# Patient Record
Sex: FEMALE | Race: WHITE | NOT HISPANIC OR LATINO | Employment: OTHER | URBAN - METROPOLITAN AREA
[De-identification: names, ages, dates, MRNs, and addresses within clinical notes are randomized per-mention and may not be internally consistent; named-entity substitution may affect disease eponyms.]

---

## 2017-12-21 ENCOUNTER — HOSPITAL ENCOUNTER (EMERGENCY)
Facility: OTHER | Age: 72
Discharge: HOME | End: 2017-12-21
Attending: EMERGENCY MEDICINE
Payer: MEDICARE

## 2017-12-21 VITALS
SYSTOLIC BLOOD PRESSURE: 147 MMHG | DIASTOLIC BLOOD PRESSURE: 72 MMHG | OXYGEN SATURATION: 96 % | TEMPERATURE: 98 F | HEART RATE: 90 BPM | RESPIRATION RATE: 18 BRPM

## 2017-12-21 DIAGNOSIS — H61.21 IMPACTED CERUMEN OF RIGHT EAR: Primary | ICD-10-CM

## 2017-12-21 DIAGNOSIS — H66.92 LEFT OTITIS MEDIA, UNSPECIFIED OTITIS MEDIA TYPE: ICD-10-CM

## 2017-12-21 LAB — POCT GLUCOSE: 265 MG/DL (ref 70–110)

## 2017-12-21 PROCEDURE — 99283 EMERGENCY DEPT VISIT LOW MDM: CPT | Mod: 25

## 2017-12-21 PROCEDURE — 82947 ASSAY GLUCOSE BLOOD QUANT: CPT

## 2017-12-21 RX ORDER — AMOXICILLIN AND CLAVULANATE POTASSIUM 875; 125 MG/1; MG/1
1 TABLET, FILM COATED ORAL 2 TIMES DAILY
Qty: 14 TABLET | Refills: 0 | Status: SHIPPED | OUTPATIENT
Start: 2017-12-21 | End: 2019-10-08 | Stop reason: CLARIF

## 2017-12-21 NOTE — ED PROVIDER NOTES
Encounter Date: 12/21/2017       History     Chief Complaint   Patient presents with    Otalgia     patient reports right ear pain X2 days     The history is provided by the patient.   Otalgia   This is a new problem. The current episode started today. There is pain in both ears. The problem occurs constantly. The problem has been unchanged. The pain is at a severity of 3/10. Pertinent negatives include no ear discharge, no headaches, no hearing loss, no rhinorrhea, no sore throat, no abdominal pain, no diarrhea, no vomiting, no neck pain, no cough and no rash. Her past medical history does not include chronic ear infection, hearing loss or tympanostomy tube.     Review of patient's allergies indicates:  No Known Allergies  Past Medical History:   Diagnosis Date    Coronary artery disease     Diabetes mellitus     Hypercholesteremia     Hypertension      Past Surgical History:   Procedure Laterality Date    APPENDECTOMY      BACK SURGERY      CHOLECYSTECTOMY      CORONARY ARTERY BYPASS GRAFT      TUBAL LIGATION       Family History   Problem Relation Age of Onset    Colon cancer Brother 50    Breast cancer Sister 67    Ovarian cancer Neg Hx      Social History   Substance Use Topics    Smoking status: Never Smoker    Smokeless tobacco: Never Used    Alcohol use No     Review of Systems   Constitutional: Negative.    HENT: Positive for ear pain. Negative for ear discharge, hearing loss, rhinorrhea and sore throat.    Eyes: Negative.    Respiratory: Negative.  Negative for cough.    Cardiovascular: Negative.    Gastrointestinal: Negative.  Negative for abdominal pain, diarrhea and vomiting.   Endocrine: Negative.    Genitourinary: Negative.    Musculoskeletal: Negative.  Negative for neck pain.   Skin: Negative.  Negative for rash.   Allergic/Immunologic: Negative.    Neurological: Negative.  Negative for headaches.   Hematological: Negative.    Psychiatric/Behavioral: Negative.    All other systems  reviewed and are negative.      Physical Exam     Initial Vitals [12/21/17 1135]   BP Pulse Resp Temp SpO2   (!) 147/72 90 18 98.1 °F (36.7 °C) 96 %      MAP       97         Physical Exam    Nursing note and vitals reviewed.  Constitutional: Vital signs are normal. She appears well-developed. She is active and cooperative.   HENT:   Head: Normocephalic and atraumatic.   Left Ear: Tympanic membrane mobility is abnormal. A middle ear effusion is present.   Ears:    Eyes: Conjunctivae, EOM and lids are normal. Pupils are equal, round, and reactive to light.   Neck: Trachea normal and full passive range of motion without pain. Neck supple. No thyroid mass present.   Cardiovascular: Normal rate, regular rhythm, S1 normal, S2 normal, normal heart sounds, intact distal pulses and normal pulses.   Abdominal: Soft. Normal appearance, normal aorta and bowel sounds are normal.   Musculoskeletal: Normal range of motion.   Lymphadenopathy:     She has no axillary adenopathy.   Neurological: She is alert and oriented to person, place, and time.   Skin: Skin is warm, dry and intact.   Psychiatric: She has a normal mood and affect. Her speech is normal and behavior is normal. Judgment and thought content normal. Cognition and memory are normal.         ED Course   Procedures  Labs Reviewed   POCT GLUCOSE - Abnormal; Notable for the following:        Result Value    POCT Glucose 265 (*)     All other components within normal limits   POCT GLUCOSE                               ED Course      Clinical Impression:   The primary encounter diagnosis was Impacted cerumen of right ear. A diagnosis of Left otitis media, unspecified otitis media type was also pertinent to this visit.                           Gume Martinez MD  12/21/17 4818

## 2019-09-10 ENCOUNTER — OFFICE VISIT (OUTPATIENT)
Dept: SURGERY | Facility: CLINIC | Age: 74
End: 2019-09-10
Payer: MEDICARE

## 2019-09-10 VITALS
HEART RATE: 70 BPM | BODY MASS INDEX: 30.66 KG/M2 | WEIGHT: 207 LBS | OXYGEN SATURATION: 100 % | HEIGHT: 69 IN | SYSTOLIC BLOOD PRESSURE: 91 MMHG | DIASTOLIC BLOOD PRESSURE: 53 MMHG

## 2019-09-10 DIAGNOSIS — K63.89 COLONIC MASS: Primary | ICD-10-CM

## 2019-09-10 PROCEDURE — 3288F FALL RISK ASSESSMENT DOCD: CPT | Mod: CPTII,S$GLB,, | Performed by: SURGERY

## 2019-09-10 PROCEDURE — 3288F PR FALLS RISK ASSESSMENT DOCUMENTED: ICD-10-PCS | Mod: CPTII,S$GLB,, | Performed by: SURGERY

## 2019-09-10 PROCEDURE — 1100F PTFALLS ASSESS-DOCD GE2>/YR: CPT | Mod: CPTII,S$GLB,, | Performed by: SURGERY

## 2019-09-10 PROCEDURE — 99204 OFFICE O/P NEW MOD 45 MIN: CPT | Mod: S$GLB,,, | Performed by: SURGERY

## 2019-09-10 PROCEDURE — 1100F PR PT FALLS ASSESS DOC 2+ FALLS/FALL W/INJURY/YR: ICD-10-PCS | Mod: CPTII,S$GLB,, | Performed by: SURGERY

## 2019-09-10 PROCEDURE — 99204 PR OFFICE/OUTPT VISIT, NEW, LEVL IV, 45-59 MIN: ICD-10-PCS | Mod: S$GLB,,, | Performed by: SURGERY

## 2019-09-10 RX ORDER — LIDOCAINE HYDROCHLORIDE 10 MG/ML
1 INJECTION, SOLUTION EPIDURAL; INFILTRATION; INTRACAUDAL; PERINEURAL ONCE
Status: SHIPPED | OUTPATIENT
Start: 2019-09-10

## 2019-09-10 RX ORDER — SODIUM CHLORIDE 9 MG/ML
INJECTION, SOLUTION INTRAVENOUS CONTINUOUS
Status: CANCELLED | OUTPATIENT
Start: 2019-09-10

## 2019-09-10 NOTE — PROGRESS NOTES
"History & Physical    SUBJECTIVE:     History of Present Illness:  Patient is a 73 y.o. female presents with with colon polyp on her last colonoscopy needing resection.    Chief Complaint   Patient presents with    Consult       Review of patient's allergies indicates:  No Known Allergies    Current Outpatient Medications   Medication Sig Dispense Refill    amlodipine (NORVASC) 10 MG tablet       amoxicillin-clavulanate 875-125mg (AUGMENTIN) 875-125 mg per tablet Take 1 tablet by mouth 2 (two) times daily. 14 tablet 0    aspirin (ECOTRIN) 325 MG EC tablet Take 325 mg by mouth once daily.      atenolol (TENORMIN) 50 MG tablet       atorvastatin (LIPITOR) 40 MG tablet       glipiZIDE (GLUCOTROL) 10 MG tablet Take 10 mg by mouth 2 (two) times daily before meals.      metformin (GLUCOPHAGE) 1000 MG tablet   0    ranitidine (ZANTAC) 150 MG tablet   2    tramadol (ULTRAM) 50 mg tablet       TRULICITY 1.5 mg/0.5 mL PnIj        No current facility-administered medications for this visit.        Past Medical History:   Diagnosis Date    Coronary artery disease     Diabetes mellitus     Hypercholesteremia     Hypertension      Past Surgical History:   Procedure Laterality Date    APPENDECTOMY      BACK SURGERY      CHOLECYSTECTOMY      CORONARY ARTERY BYPASS GRAFT      TUBAL LIGATION       Family History   Problem Relation Age of Onset    Colon cancer Brother 50    Breast cancer Sister 67    Ovarian cancer Neg Hx      Social History     Tobacco Use    Smoking status: Never Smoker    Smokeless tobacco: Never Used   Substance Use Topics    Alcohol use: No    Drug use: No        Review of Systems:  Review of Systems    OBJECTIVE:     Vital Signs (Most Recent)  Pulse: 70 (09/10/19 1408)  BP: (!) 91/53 (09/10/19 1408)  SpO2: 100 % (09/10/19 1408)  5' 9" (1.753 m)  93.9 kg (207 lb)     Physical Exam:  Physical Exam   Constitutional: She is oriented to person, place, and time. She appears well-developed " and well-nourished.   HENT:   Head: Normocephalic and atraumatic.   Right Ear: External ear normal.   Left Ear: External ear normal.   Nose: Nose normal.   Mouth/Throat: Oropharynx is clear and moist.   Eyes: Pupils are equal, round, and reactive to light. Conjunctivae and EOM are normal.   Neck: Normal range of motion. Neck supple.   Cardiovascular: Normal rate, regular rhythm, normal heart sounds and intact distal pulses.   Pulmonary/Chest: Effort normal and breath sounds normal.   Abdominal: Soft. Bowel sounds are normal.   Musculoskeletal: Normal range of motion.   Neurological: She is alert and oriented to person, place, and time. She has normal reflexes.   Skin: Skin is warm and dry.   Psychiatric: She has a normal mood and affect. Her behavior is normal. Thought content normal.   Vitals reviewed.      Laboratory  none    Diagnostic Results:  none    ASSESSMENT/PLAN:     Right Colon polyp    PLAN:Plan     I will take her to the OR for lap R colectomy with the risks and possible complications were explained

## 2019-09-11 DIAGNOSIS — K63.89 COLONIC MASS: Primary | ICD-10-CM

## 2019-09-11 RX ORDER — POLYETHYLENE GLYCOL 3350, SODIUM SULFATE ANHYDROUS, SODIUM BICARBONATE, SODIUM CHLORIDE, POTASSIUM CHLORIDE 236; 22.74; 6.74; 5.86; 2.97 G/4L; G/4L; G/4L; G/4L; G/4L
4 POWDER, FOR SOLUTION ORAL ONCE
Qty: 4000 ML | Refills: 0 | Status: SHIPPED | OUTPATIENT
Start: 2019-09-11 | End: 2019-09-11

## 2019-09-11 RX ORDER — METRONIDAZOLE 500 MG/1
TABLET ORAL
Qty: 4 TABLET | Refills: 0 | Status: SHIPPED | OUTPATIENT
Start: 2019-09-11 | End: 2019-10-08 | Stop reason: CLARIF

## 2019-09-11 RX ORDER — NEOMYCIN SULFATE 500 MG/1
TABLET ORAL
Qty: 4 TABLET | Refills: 0 | Status: SHIPPED | OUTPATIENT
Start: 2019-09-11 | End: 2019-10-08 | Stop reason: CLARIF

## 2019-10-08 ENCOUNTER — HOSPITAL ENCOUNTER (OUTPATIENT)
Dept: PREADMISSION TESTING | Facility: HOSPITAL | Age: 74
Discharge: HOME | End: 2019-10-08
Attending: SURGERY
Payer: MEDICARE

## 2019-10-08 VITALS
HEIGHT: 69 IN | BODY MASS INDEX: 25.48 KG/M2 | OXYGEN SATURATION: 98 % | SYSTOLIC BLOOD PRESSURE: 105 MMHG | TEMPERATURE: 98 F | WEIGHT: 172 LBS | DIASTOLIC BLOOD PRESSURE: 65 MMHG | RESPIRATION RATE: 18 BRPM | HEART RATE: 68 BPM

## 2019-10-08 DIAGNOSIS — K63.89 COLONIC MASS: ICD-10-CM

## 2019-10-08 LAB
ALBUMIN SERPL BCP-MCNC: 3.5 G/DL (ref 3.5–5.2)
ALP SERPL-CCNC: 251 U/L (ref 55–135)
ALT SERPL W/O P-5'-P-CCNC: 20 U/L (ref 10–44)
ANION GAP SERPL CALC-SCNC: 8 MMOL/L (ref 8–16)
AST SERPL-CCNC: 28 U/L (ref 10–40)
BASOPHILS # BLD AUTO: 0.01 K/UL (ref 0–0.2)
BASOPHILS NFR BLD: 0.2 % (ref 0–1.9)
BILIRUB SERPL-MCNC: 0.7 MG/DL (ref 0.1–1)
BUN SERPL-MCNC: 25 MG/DL (ref 8–23)
CALCIUM SERPL-MCNC: 9.4 MG/DL (ref 8.7–10.5)
CHLORIDE SERPL-SCNC: 104 MMOL/L (ref 95–110)
CO2 SERPL-SCNC: 27 MMOL/L (ref 23–29)
CREAT SERPL-MCNC: 0.8 MG/DL (ref 0.5–1.4)
DIFFERENTIAL METHOD: ABNORMAL
EOSINOPHIL # BLD AUTO: 0.1 K/UL (ref 0–0.5)
EOSINOPHIL NFR BLD: 1.8 % (ref 0–8)
ERYTHROCYTE [DISTWIDTH] IN BLOOD BY AUTOMATED COUNT: 16.2 % (ref 11.5–14.5)
EST. GFR  (AFRICAN AMERICAN): >60 ML/MIN/1.73 M^2
EST. GFR  (NON AFRICAN AMERICAN): >60 ML/MIN/1.73 M^2
GLUCOSE SERPL-MCNC: 153 MG/DL (ref 70–110)
HCT VFR BLD AUTO: 32 % (ref 37–48.5)
HGB BLD-MCNC: 10 G/DL (ref 12–16)
LYMPHOCYTES # BLD AUTO: 0.7 K/UL (ref 1–4.8)
LYMPHOCYTES NFR BLD: 16.2 % (ref 18–48)
MCH RBC QN AUTO: 27.2 PG (ref 27–31)
MCHC RBC AUTO-ENTMCNC: 31.3 G/DL (ref 32–36)
MCV RBC AUTO: 87 FL (ref 82–98)
MONOCYTES # BLD AUTO: 0.3 K/UL (ref 0.3–1)
MONOCYTES NFR BLD: 5.6 % (ref 4–15)
NEUTROPHILS # BLD AUTO: 3.4 K/UL (ref 1.8–7.7)
NEUTROPHILS NFR BLD: 76.4 % (ref 38–73)
PLATELET # BLD AUTO: 98 K/UL (ref 150–350)
PMV BLD AUTO: 10.6 FL (ref 9.2–12.9)
POTASSIUM SERPL-SCNC: 4.1 MMOL/L (ref 3.5–5.1)
PROT SERPL-MCNC: 7.4 G/DL (ref 6–8.4)
RBC # BLD AUTO: 3.67 M/UL (ref 4–5.4)
SODIUM SERPL-SCNC: 139 MMOL/L (ref 136–145)
WBC # BLD AUTO: 4.44 K/UL (ref 3.9–12.7)

## 2019-10-08 PROCEDURE — 85025 COMPLETE CBC W/AUTO DIFF WBC: CPT

## 2019-10-08 PROCEDURE — 80053 COMPREHEN METABOLIC PANEL: CPT

## 2019-10-08 RX ORDER — ACETAMINOPHEN 500 MG
5 TABLET ORAL NIGHTLY
COMMUNITY

## 2019-10-08 RX ORDER — CHOLECALCIFEROL (VITAMIN D3) 1250 MCG
1 TABLET ORAL WEEKLY
COMMUNITY

## 2019-10-08 RX ORDER — ESCITALOPRAM OXALATE 10 MG/1
10 TABLET ORAL DAILY
COMMUNITY

## 2019-10-08 RX ORDER — DOCUSATE SODIUM 100 MG/1
100 CAPSULE, LIQUID FILLED ORAL DAILY
COMMUNITY

## 2019-10-08 RX ORDER — MIRTAZAPINE 7.5 MG/1
7.5 TABLET, FILM COATED ORAL DAILY
COMMUNITY

## 2019-10-08 RX ORDER — INSULIN ASPART 100 [IU]/ML
INJECTION, SOLUTION INTRAVENOUS; SUBCUTANEOUS
COMMUNITY

## 2019-10-08 RX ORDER — PANTOPRAZOLE SODIUM 20 MG/1
20 TABLET, DELAYED RELEASE ORAL DAILY
COMMUNITY

## 2019-10-08 RX ORDER — LOPERAMIDE HCL 2 MG
4 TABLET ORAL DAILY PRN
COMMUNITY

## 2019-10-08 RX ORDER — SIMETHICONE 80 MG
80 TABLET,CHEWABLE ORAL 2 TIMES DAILY
COMMUNITY

## 2019-10-08 RX ORDER — METOPROLOL SUCCINATE 50 MG/1
50 TABLET, EXTENDED RELEASE ORAL DAILY
COMMUNITY

## 2019-10-08 RX ORDER — POTASSIUM CHLORIDE 750 MG/1
10 TABLET, EXTENDED RELEASE ORAL 2 TIMES DAILY
COMMUNITY

## 2019-10-08 RX ORDER — FUROSEMIDE 40 MG/1
40 TABLET ORAL DAILY
COMMUNITY

## 2019-10-08 RX ORDER — GABAPENTIN 100 MG/1
200 CAPSULE ORAL NIGHTLY
COMMUNITY

## 2019-10-08 RX ORDER — ACETAMINOPHEN 500 MG
500 TABLET ORAL EVERY 8 HOURS PRN
COMMUNITY

## 2019-10-08 NOTE — DISCHARGE INSTRUCTIONS
"Your procedure  is scheduled for _10/15/2019_________.    Call [phone] between 2pm and 5pm on _10/14/2019______to find out your arrival time for the day of surgery.    Report to Same Day Surgery Unit at ____ AM on the 2nd floor of the hospital.  Use the front entrance of the hospital.  The front doors of the hospital open promptly at 5:30am.  If you need wheelchair assistance, call [phone] from your cell phone, or call "0" from the courtesy phone in the lobby.    Important instructions:   Do not eat or drink after 12 midnight, including water.  It is okay to brush your teeth.  Do not have gum, candy or mints.     Take only these medications with a small swallow of water on the morning of your surgery _metoprolol, mirtazapine, lexapro_____________    Do not take any diabetic medication on the morning of surgery unless instructed to do so by your doctor or pre op nurse.    Hold apixaban for 72 hours before surgery.    Stop taking Aspirin, Ibuprofen, Motrin and Aleve , Fish oil, and Vitamin E for at least 7 days before your surgery. You may use Tylenol unless otherwise instructed by your doctor.                 Follow bowel prep as ordered by Dr Ellis.     Please shower the night before and the morning of your surgery.        Use Hibiclens soap as instructed by your pre op nurse.   Please place clean linens on your bed the night before surgery. Please wear fresh clean clothing after each shower.     No shaving of procedural area at least 4-5 days before surgery due to increased risk of skin irritation and/or possible infection.      You may be asked to take a third shower on arrival to Same Day Surgery depending on the type of surgery you are having.     Do not wear make- up, including mascara.     You may wear deodorant only.      Do not wear powder, body lotion or perfume/cologne.     Do not wear any jewelry or have any metal on your body.     You will be asked to remove any dentures or partials for " the procedure.     Please bring any documents given to you by your doctor.     If you are going home on the same day of surgery, you must arrange for a family member or a friend to drive you home.  Public transportation is prohibited.  You will not be able to drive home if you were given anesthesia or sedation.     Wear loose fitting clothes allowing for bandages.     Please leave money and valuables home.       You may bring your cell phone.     Call the doctor if fever or illness should occur before your surgery.    Call [phone] to contact us here if needed.

## 2019-10-14 ENCOUNTER — ANESTHESIA EVENT (OUTPATIENT)
Dept: SURGERY | Facility: HOSPITAL | Age: 74
DRG: 329 | End: 2019-10-14
Payer: MEDICARE

## 2019-10-14 NOTE — ANESTHESIA PREPROCEDURE EVALUATION
10/14/2019  Ivan Cruz is a 73 y.o., female.    Anesthesia Evaluation     I have reviewed the Nursing Notes.      Review of Systems  Anesthesia Hx:  No problems with previous Anesthesia   Social:  Non-Smoker    Hematology/Oncology:         -- Anemia: Hematology Comments: Pancytopenia; Hb =10, plt=98  On eliquis for afib   Cardiovascular:   Exercise tolerance: poor Denies Pacemaker. Hypertension Valvular problems/Murmurs, AS CAD   CABG/stent (1999) Dysrhythmias atrial fibrillation CHF hyperlipidemia ECG has been reviewed.    Pulmonary:  Pulmonary Normal    Renal/:  Renal/ Normal     Hepatic/GI:   Denies PUD. Denies Hiatal Hernia. GERD Denies Liver Disease.  Denies Hepatitis.    Musculoskeletal:   Arthritis     Neurological:   Denies Seizures.    Endocrine:   Diabetes, type 2, using insulin        Physical Exam  General:  Well nourished Frail appearing    Airway/Jaw/Neck:  AIRWAY FINDINGS: Normal      Chest/Lungs:  Chest/Lungs Clear    Heart/Vascular:  Heart Findings: Normal       Mental Status:  Mental Status Findings: Normal        Anesthesia Plan  Type of Anesthesia, risks & benefits discussed:  Anesthesia Type:  general  Patient's Preference:   Intra-op Monitoring Plan: standard ASA monitors and arterial line  Intra-op Monitoring Plan Comments:   Post Op Pain Control Plan:   Post Op Pain Control Plan Comments:   Induction:   IV  Beta Blocker:  Patient is on a Beta-Blocker and has received one dose within the past 24 hours (No further documentation required).       Informed Consent: Patient understands risks and agrees with Anesthesia plan.  Questions answered. Anesthesia consent signed with patient.  ASA Score: 3     Day of Surgery Review of History & Physical:  There are no significant changes.  H&P update referred to the provider.         Ready For Surgery From Anesthesia Perspective.

## 2019-10-15 ENCOUNTER — ANESTHESIA (OUTPATIENT)
Dept: SURGERY | Facility: HOSPITAL | Age: 74
DRG: 329 | End: 2019-10-15
Payer: MEDICARE

## 2019-10-15 ENCOUNTER — TELEPHONE (OUTPATIENT)
Dept: SURGERY | Facility: CLINIC | Age: 74
End: 2019-10-15

## 2019-10-15 ENCOUNTER — HOSPITAL ENCOUNTER (INPATIENT)
Facility: HOSPITAL | Age: 74
LOS: 8 days | Discharge: HOME | DRG: 329 | End: 2019-10-23
Attending: SURGERY | Admitting: SURGERY
Payer: MEDICARE

## 2019-10-15 DIAGNOSIS — K63.89 COLONIC MASS: Primary | ICD-10-CM

## 2019-10-15 DIAGNOSIS — I50.30 (HFPEF) HEART FAILURE WITH PRESERVED EJECTION FRACTION: ICD-10-CM

## 2019-10-15 LAB
ABO + RH BLD: NORMAL
BLD GP AB SCN CELLS X3 SERPL QL: NORMAL
BLOOD GROUP ANTIBODIES SERPL: NORMAL
POCT GLUCOSE: 119 MG/DL (ref 70–110)
POCT GLUCOSE: 144 MG/DL (ref 70–110)
POCT GLUCOSE: 148 MG/DL (ref 70–110)
POCT GLUCOSE: 157 MG/DL (ref 70–110)
RH BLD: NORMAL

## 2019-10-15 PROCEDURE — 25000003 PHARM REV CODE 250: Performed by: NURSE ANESTHETIST, CERTIFIED REGISTERED

## 2019-10-15 PROCEDURE — 88307 TISSUE SPECIMEN TO PATHOLOGY - SURGERY: ICD-10-PCS | Mod: 26,,, | Performed by: PATHOLOGY

## 2019-10-15 PROCEDURE — 63600175 PHARM REV CODE 636 W HCPCS: Performed by: NURSE ANESTHETIST, CERTIFIED REGISTERED

## 2019-10-15 PROCEDURE — D9220A PRA ANESTHESIA: Mod: ANES,,, | Performed by: ANESTHESIOLOGY

## 2019-10-15 PROCEDURE — 63600175 PHARM REV CODE 636 W HCPCS: Performed by: ANESTHESIOLOGY

## 2019-10-15 PROCEDURE — V2790 AMNIOTIC MEMBRANE: HCPCS | Performed by: SURGERY

## 2019-10-15 PROCEDURE — 27201423 OPTIME MED/SURG SUP & DEVICES STERILE SUPPLY: Performed by: SURGERY

## 2019-10-15 PROCEDURE — 36000710: Performed by: SURGERY

## 2019-10-15 PROCEDURE — 86850 RBC ANTIBODY SCREEN: CPT

## 2019-10-15 PROCEDURE — 86922 COMPATIBILITY TEST ANTIGLOB: CPT

## 2019-10-15 PROCEDURE — 99900035 HC TECH TIME PER 15 MIN (STAT)

## 2019-10-15 PROCEDURE — 37000009 HC ANESTHESIA EA ADD 15 MINS: Performed by: SURGERY

## 2019-10-15 PROCEDURE — 94760 N-INVAS EAR/PLS OXIMETRY 1: CPT

## 2019-10-15 PROCEDURE — 25000003 PHARM REV CODE 250: Performed by: SURGERY

## 2019-10-15 PROCEDURE — 86870 RBC ANTIBODY IDENTIFICATION: CPT

## 2019-10-15 PROCEDURE — 63600175 PHARM REV CODE 636 W HCPCS: Performed by: SURGERY

## 2019-10-15 PROCEDURE — 37000008 HC ANESTHESIA 1ST 15 MINUTES: Performed by: SURGERY

## 2019-10-15 PROCEDURE — 71000033 HC RECOVERY, INTIAL HOUR: Performed by: SURGERY

## 2019-10-15 PROCEDURE — 44205 PR LAP,SURG,COLECTOMY,W/REMVL TERM ILEUM: ICD-10-PCS | Mod: ,,, | Performed by: SURGERY

## 2019-10-15 PROCEDURE — 86901 BLOOD TYPING SEROLOGIC RH(D): CPT

## 2019-10-15 PROCEDURE — 86905 BLOOD TYPING RBC ANTIGENS: CPT | Mod: 91

## 2019-10-15 PROCEDURE — 44205 LAP COLECTOMY PART W/ILEUM: CPT | Mod: ,,, | Performed by: SURGERY

## 2019-10-15 PROCEDURE — 36415 COLL VENOUS BLD VENIPUNCTURE: CPT

## 2019-10-15 PROCEDURE — 36000711: Performed by: SURGERY

## 2019-10-15 PROCEDURE — 82962 GLUCOSE BLOOD TEST: CPT | Performed by: SURGERY

## 2019-10-15 PROCEDURE — 86902 BLOOD TYPE ANTIGEN DONOR EA: CPT

## 2019-10-15 PROCEDURE — 88307 TISSUE EXAM BY PATHOLOGIST: CPT | Mod: 26,,, | Performed by: PATHOLOGY

## 2019-10-15 PROCEDURE — 94761 N-INVAS EAR/PLS OXIMETRY MLT: CPT

## 2019-10-15 PROCEDURE — D9220A PRA ANESTHESIA: ICD-10-PCS | Mod: CRNA,,, | Performed by: NURSE ANESTHETIST, CERTIFIED REGISTERED

## 2019-10-15 PROCEDURE — 27000221 HC OXYGEN, UP TO 24 HOURS

## 2019-10-15 PROCEDURE — C9290 INJ, BUPIVACAINE LIPOSOME: HCPCS | Performed by: SURGERY

## 2019-10-15 PROCEDURE — 63600175 PHARM REV CODE 636 W HCPCS

## 2019-10-15 PROCEDURE — 88307 TISSUE EXAM BY PATHOLOGIST: CPT | Performed by: PATHOLOGY

## 2019-10-15 PROCEDURE — D9220A PRA ANESTHESIA: ICD-10-PCS | Mod: ANES,,, | Performed by: ANESTHESIOLOGY

## 2019-10-15 PROCEDURE — 11000001 HC ACUTE MED/SURG PRIVATE ROOM

## 2019-10-15 PROCEDURE — 71000039 HC RECOVERY, EACH ADD'L HOUR: Performed by: SURGERY

## 2019-10-15 PROCEDURE — D9220A PRA ANESTHESIA: Mod: CRNA,,, | Performed by: NURSE ANESTHETIST, CERTIFIED REGISTERED

## 2019-10-15 DEVICE — MEMBRANE AMNIOFIX 2X12CM: Type: IMPLANTABLE DEVICE | Site: ABDOMEN | Status: FUNCTIONAL

## 2019-10-15 RX ORDER — MUPIROCIN 20 MG/G
1 OINTMENT TOPICAL 2 TIMES DAILY
Status: DISPENSED | OUTPATIENT
Start: 2019-10-15 | End: 2019-10-20

## 2019-10-15 RX ORDER — SODIUM CHLORIDE, SODIUM LACTATE, POTASSIUM CHLORIDE, CALCIUM CHLORIDE 600; 310; 30; 20 MG/100ML; MG/100ML; MG/100ML; MG/100ML
INJECTION, SOLUTION INTRAVENOUS CONTINUOUS PRN
Status: DISCONTINUED | OUTPATIENT
Start: 2019-10-15 | End: 2019-10-15

## 2019-10-15 RX ORDER — INSULIN ASPART 100 [IU]/ML
0-5 INJECTION, SOLUTION INTRAVENOUS; SUBCUTANEOUS
Status: DISCONTINUED | OUTPATIENT
Start: 2019-10-15 | End: 2019-10-23 | Stop reason: HOSPADM

## 2019-10-15 RX ORDER — GLYCOPYRROLATE 0.2 MG/ML
INJECTION INTRAMUSCULAR; INTRAVENOUS
Status: DISCONTINUED | OUTPATIENT
Start: 2019-10-15 | End: 2019-10-15

## 2019-10-15 RX ORDER — ESCITALOPRAM OXALATE 10 MG/1
10 TABLET ORAL DAILY
Status: DISCONTINUED | OUTPATIENT
Start: 2019-10-15 | End: 2019-10-16

## 2019-10-15 RX ORDER — ACETAMINOPHEN 10 MG/ML
1000 INJECTION, SOLUTION INTRAVENOUS ONCE
Status: DISCONTINUED | OUTPATIENT
Start: 2019-10-15 | End: 2019-10-15

## 2019-10-15 RX ORDER — SODIUM CHLORIDE 0.9 % (FLUSH) 0.9 %
3 SYRINGE (ML) INJECTION
Status: DISCONTINUED | OUTPATIENT
Start: 2019-10-15 | End: 2019-10-15 | Stop reason: HOSPADM

## 2019-10-15 RX ORDER — SODIUM CHLORIDE 9 MG/ML
INJECTION, SOLUTION INTRAVENOUS CONTINUOUS
Status: DISCONTINUED | OUTPATIENT
Start: 2019-10-15 | End: 2019-10-16

## 2019-10-15 RX ORDER — NEOSTIGMINE METHYLSULFATE 1 MG/ML
INJECTION, SOLUTION INTRAVENOUS
Status: DISCONTINUED | OUTPATIENT
Start: 2019-10-15 | End: 2019-10-15

## 2019-10-15 RX ORDER — BUPIVACAINE HYDROCHLORIDE 2.5 MG/ML
INJECTION, SOLUTION EPIDURAL; INFILTRATION; INTRACAUDAL
Status: DISCONTINUED | OUTPATIENT
Start: 2019-10-15 | End: 2019-10-15 | Stop reason: HOSPADM

## 2019-10-15 RX ORDER — ACETAMINOPHEN 10 MG/ML
INJECTION, SOLUTION INTRAVENOUS
Status: DISPENSED
Start: 2019-10-15 | End: 2019-10-15

## 2019-10-15 RX ORDER — GLUCAGON 1 MG
1 KIT INJECTION
Status: DISCONTINUED | OUTPATIENT
Start: 2019-10-15 | End: 2019-10-23 | Stop reason: HOSPADM

## 2019-10-15 RX ORDER — MORPHINE SULFATE 10 MG/ML
3 INJECTION INTRAMUSCULAR; INTRAVENOUS; SUBCUTANEOUS EVERY 4 HOURS PRN
Status: DISCONTINUED | OUTPATIENT
Start: 2019-10-15 | End: 2019-10-16

## 2019-10-15 RX ORDER — MORPHINE SULFATE 10 MG/ML
5 INJECTION INTRAMUSCULAR; INTRAVENOUS; SUBCUTANEOUS EVERY 4 HOURS PRN
Status: DISCONTINUED | OUTPATIENT
Start: 2019-10-15 | End: 2019-10-15

## 2019-10-15 RX ORDER — SODIUM CHLORIDE 9 MG/ML
INJECTION, SOLUTION INTRAVENOUS CONTINUOUS
Status: DISCONTINUED | OUTPATIENT
Start: 2019-10-15 | End: 2019-10-15

## 2019-10-15 RX ORDER — ETOMIDATE 2 MG/ML
INJECTION INTRAVENOUS
Status: DISCONTINUED | OUTPATIENT
Start: 2019-10-15 | End: 2019-10-15

## 2019-10-15 RX ORDER — SODIUM CHLORIDE, SODIUM LACTATE, POTASSIUM CHLORIDE, CALCIUM CHLORIDE 600; 310; 30; 20 MG/100ML; MG/100ML; MG/100ML; MG/100ML
INJECTION, SOLUTION INTRAVENOUS CONTINUOUS
Status: DISCONTINUED | OUTPATIENT
Start: 2019-10-15 | End: 2019-10-15

## 2019-10-15 RX ORDER — IBUPROFEN 200 MG
24 TABLET ORAL
Status: DISCONTINUED | OUTPATIENT
Start: 2019-10-15 | End: 2019-10-23 | Stop reason: HOSPADM

## 2019-10-15 RX ORDER — MIRTAZAPINE 7.5 MG/1
7.5 TABLET, FILM COATED ORAL DAILY
Status: DISCONTINUED | OUTPATIENT
Start: 2019-10-15 | End: 2019-10-16

## 2019-10-15 RX ORDER — HYDROMORPHONE HYDROCHLORIDE 2 MG/ML
INJECTION, SOLUTION INTRAMUSCULAR; INTRAVENOUS; SUBCUTANEOUS
Status: COMPLETED
Start: 2019-10-15 | End: 2019-10-15

## 2019-10-15 RX ORDER — LIDOCAINE HYDROCHLORIDE 10 MG/ML
1 INJECTION, SOLUTION EPIDURAL; INFILTRATION; INTRACAUDAL; PERINEURAL ONCE
Status: DISCONTINUED | OUTPATIENT
Start: 2019-10-15 | End: 2019-10-15

## 2019-10-15 RX ORDER — FENTANYL CITRATE 50 UG/ML
25 INJECTION, SOLUTION INTRAMUSCULAR; INTRAVENOUS EVERY 5 MIN PRN
Status: DISCONTINUED | OUTPATIENT
Start: 2019-10-15 | End: 2019-10-15 | Stop reason: HOSPADM

## 2019-10-15 RX ORDER — PHENYLEPHRINE HYDROCHLORIDE 10 MG/ML
INJECTION INTRAVENOUS
Status: DISCONTINUED | OUTPATIENT
Start: 2019-10-15 | End: 2019-10-15

## 2019-10-15 RX ORDER — ALVIMOPAN 12 MG/1
12 CAPSULE ORAL 2 TIMES DAILY
Status: DISCONTINUED | OUTPATIENT
Start: 2019-10-15 | End: 2019-10-16

## 2019-10-15 RX ORDER — LIDOCAINE HCL/PF 100 MG/5ML
SYRINGE (ML) INTRAVENOUS
Status: DISCONTINUED | OUTPATIENT
Start: 2019-10-15 | End: 2019-10-15

## 2019-10-15 RX ORDER — IBUPROFEN 200 MG
16 TABLET ORAL
Status: DISCONTINUED | OUTPATIENT
Start: 2019-10-15 | End: 2019-10-23 | Stop reason: HOSPADM

## 2019-10-15 RX ORDER — ONDANSETRON 2 MG/ML
INJECTION INTRAMUSCULAR; INTRAVENOUS
Status: DISCONTINUED | OUTPATIENT
Start: 2019-10-15 | End: 2019-10-15

## 2019-10-15 RX ORDER — HYDROMORPHONE HYDROCHLORIDE 2 MG/ML
0.2 INJECTION, SOLUTION INTRAMUSCULAR; INTRAVENOUS; SUBCUTANEOUS EVERY 5 MIN PRN
Status: DISCONTINUED | OUTPATIENT
Start: 2019-10-15 | End: 2019-10-15 | Stop reason: HOSPADM

## 2019-10-15 RX ORDER — CEFAZOLIN SODIUM 2 G/50ML
2 SOLUTION INTRAVENOUS
Status: COMPLETED | OUTPATIENT
Start: 2019-10-15 | End: 2019-10-15

## 2019-10-15 RX ORDER — FENTANYL CITRATE 50 UG/ML
INJECTION, SOLUTION INTRAMUSCULAR; INTRAVENOUS
Status: DISCONTINUED | OUTPATIENT
Start: 2019-10-15 | End: 2019-10-15

## 2019-10-15 RX ORDER — ROCURONIUM BROMIDE 10 MG/ML
INJECTION, SOLUTION INTRAVENOUS
Status: DISCONTINUED | OUTPATIENT
Start: 2019-10-15 | End: 2019-10-15

## 2019-10-15 RX ORDER — LIDOCAINE HYDROCHLORIDE 10 MG/ML
1 INJECTION, SOLUTION EPIDURAL; INFILTRATION; INTRACAUDAL; PERINEURAL ONCE
Status: DISCONTINUED | OUTPATIENT
Start: 2019-10-15 | End: 2019-10-23 | Stop reason: HOSPADM

## 2019-10-15 RX ORDER — METOPROLOL SUCCINATE 50 MG/1
50 TABLET, EXTENDED RELEASE ORAL DAILY
Status: DISCONTINUED | OUTPATIENT
Start: 2019-10-15 | End: 2019-10-23 | Stop reason: HOSPADM

## 2019-10-15 RX ORDER — ACETAMINOPHEN 10 MG/ML
1000 INJECTION, SOLUTION INTRAVENOUS ONCE
Status: COMPLETED | OUTPATIENT
Start: 2019-10-15 | End: 2019-10-15

## 2019-10-15 RX ADMIN — METOPROLOL SUCCINATE 50 MG: 50 TABLET, EXTENDED RELEASE ORAL at 04:10

## 2019-10-15 RX ADMIN — ROCURONIUM BROMIDE 15 MG: 10 INJECTION, SOLUTION INTRAVENOUS at 09:10

## 2019-10-15 RX ADMIN — PHENYLEPHRINE HYDROCHLORIDE 200 MCG: 10 INJECTION INTRAVENOUS at 10:10

## 2019-10-15 RX ADMIN — ETOMIDATE 12 MG: 2 INJECTION, SOLUTION INTRAVENOUS at 08:10

## 2019-10-15 RX ADMIN — CEFAZOLIN SODIUM 2 G: 2 SOLUTION INTRAVENOUS at 08:10

## 2019-10-15 RX ADMIN — MORPHINE SULFATE 3 MG: 10 INJECTION INTRAVENOUS at 08:10

## 2019-10-15 RX ADMIN — LIDOCAINE HYDROCHLORIDE 50 MG: 20 INJECTION, SOLUTION INTRAVENOUS at 08:10

## 2019-10-15 RX ADMIN — PHENYLEPHRINE HYDROCHLORIDE 100 MCG: 10 INJECTION INTRAVENOUS at 08:10

## 2019-10-15 RX ADMIN — ROCURONIUM BROMIDE 20 MG: 10 INJECTION, SOLUTION INTRAVENOUS at 09:10

## 2019-10-15 RX ADMIN — FENTANYL CITRATE 25 MCG: 50 INJECTION INTRAMUSCULAR; INTRAVENOUS at 08:10

## 2019-10-15 RX ADMIN — SODIUM CHLORIDE: 0.9 INJECTION, SOLUTION INTRAVENOUS at 11:10

## 2019-10-15 RX ADMIN — ROCURONIUM BROMIDE 30 MG: 10 INJECTION, SOLUTION INTRAVENOUS at 08:10

## 2019-10-15 RX ADMIN — HYDROMORPHONE HYDROCHLORIDE 0.2 MG: 2 INJECTION, SOLUTION INTRAMUSCULAR; INTRAVENOUS; SUBCUTANEOUS at 12:10

## 2019-10-15 RX ADMIN — PHENYLEPHRINE HYDROCHLORIDE 100 MCG: 10 INJECTION INTRAVENOUS at 10:10

## 2019-10-15 RX ADMIN — MUPIROCIN 1 G: 20 OINTMENT TOPICAL at 08:10

## 2019-10-15 RX ADMIN — NEOSTIGMINE METHYLSULFATE 4 MG: 1 INJECTION INTRAVENOUS at 10:10

## 2019-10-15 RX ADMIN — PHENYLEPHRINE HYDROCHLORIDE 100 MCG: 10 INJECTION INTRAVENOUS at 09:10

## 2019-10-15 RX ADMIN — SODIUM CHLORIDE: 0.9 INJECTION, SOLUTION INTRAVENOUS at 03:10

## 2019-10-15 RX ADMIN — GLYCOPYRROLATE 0.4 MG: 0.2 INJECTION, SOLUTION INTRAMUSCULAR; INTRAVENOUS at 10:10

## 2019-10-15 RX ADMIN — ESCITALOPRAM OXALATE 10 MG: 10 TABLET ORAL at 04:10

## 2019-10-15 RX ADMIN — PHENYLEPHRINE HYDROCHLORIDE 200 MCG: 10 INJECTION INTRAVENOUS at 08:10

## 2019-10-15 RX ADMIN — ROCURONIUM BROMIDE 15 MG: 10 INJECTION, SOLUTION INTRAVENOUS at 10:10

## 2019-10-15 RX ADMIN — FENTANYL CITRATE 25 MCG: 50 INJECTION INTRAMUSCULAR; INTRAVENOUS at 10:10

## 2019-10-15 RX ADMIN — MIRTAZAPINE 7.5 MG: 7.5 TABLET ORAL at 04:10

## 2019-10-15 RX ADMIN — SODIUM CHLORIDE, SODIUM LACTATE, POTASSIUM CHLORIDE, AND CALCIUM CHLORIDE: .6; .31; .03; .02 INJECTION, SOLUTION INTRAVENOUS at 07:10

## 2019-10-15 RX ADMIN — ACETAMINOPHEN 1000 MG: 10 INJECTION, SOLUTION INTRAVENOUS at 11:10

## 2019-10-15 RX ADMIN — SODIUM CHLORIDE, SODIUM LACTATE, POTASSIUM CHLORIDE, AND CALCIUM CHLORIDE: .6; .31; .03; .02 INJECTION, SOLUTION INTRAVENOUS at 09:10

## 2019-10-15 RX ADMIN — ALVIMOPAN 12 MG: 12 CAPSULE ORAL at 08:10

## 2019-10-15 RX ADMIN — ONDANSETRON 4 MG: 2 INJECTION, SOLUTION INTRAMUSCULAR; INTRAVENOUS at 10:10

## 2019-10-15 NOTE — NURSING
contacted about pt's being very sleepy. He ordered morphine to be decreased to 3mg every 4hrs, continuous pulse ox, and ssi. Pt is currently more awake and alert c/o pain. RT will bring cont pulse ox machine to room.

## 2019-10-15 NOTE — NURSING
Pt arrived to unit, sleepy but easily arousable, oriented x3, unable to tell me what kind of surgery she had today. Denies pain. Incision to R adomen c/d/i. Island dressings c/d/i. Redness noted to sacrum, blanchable, foam dressing applied as well as wedge to maintained positioned to the side.

## 2019-10-15 NOTE — TRANSFER OF CARE
"Anesthesia Transfer of Care Note    Patient: Ivan Cruz    Procedure(s) Performed: Procedure(s) (LRB):  COLECTOMY, RIGHT, LAPAROSCOPIC (N/A)    Patient location: PACU    Anesthesia Type: general    Transport from OR: Transported from OR on room air with adequate spontaneous ventilation    Post pain: adequate analgesia    Post assessment: no apparent anesthetic complications and tolerated procedure well    Post vital signs: stable    Level of consciousness: responds to stimulation and sedated    Nausea/Vomiting: no nausea/vomiting    Complications: none    Transfer of care protocol was followed      Last vitals:   Visit Vitals  /75 (BP Location: Right arm, Patient Position: Lying)   Pulse 80   Temp 37 °C (98.6 °F) (Oral)   Resp 10   Ht 5' 9" (1.753 m)   Wt 78 kg (171 lb 15.3 oz)   SpO2 100%   Breastfeeding? No   BMI 25.39 kg/m²     "

## 2019-10-15 NOTE — H&P
"History & Physical     SUBJECTIVE:      History of Present Illness:  Patient is a 73 y.o. female presents with with colon polyp on her last colonoscopy needing resection.         Chief Complaint   Patient presents with    Consult         Review of patient's allergies indicates:  No Known Allergies            Current Outpatient Medications   Medication Sig Dispense Refill    amlodipine (NORVASC) 10 MG tablet          amoxicillin-clavulanate 875-125mg (AUGMENTIN) 875-125 mg per tablet Take 1 tablet by mouth 2 (two) times daily. 14 tablet 0    aspirin (ECOTRIN) 325 MG EC tablet Take 325 mg by mouth once daily.        atenolol (TENORMIN) 50 MG tablet          atorvastatin (LIPITOR) 40 MG tablet          glipiZIDE (GLUCOTROL) 10 MG tablet Take 10 mg by mouth 2 (two) times daily before meals.        metformin (GLUCOPHAGE) 1000 MG tablet     0    ranitidine (ZANTAC) 150 MG tablet     2    tramadol (ULTRAM) 50 mg tablet          TRULICITY 1.5 mg/0.5 mL PnIj            No current facility-administered medications for this visit.               Past Medical History:   Diagnosis Date    Coronary artery disease      Diabetes mellitus      Hypercholesteremia      Hypertension              Past Surgical History:   Procedure Laterality Date    APPENDECTOMY        BACK SURGERY        CHOLECYSTECTOMY        CORONARY ARTERY BYPASS GRAFT        TUBAL LIGATION                Family History   Problem Relation Age of Onset    Colon cancer Brother 50    Breast cancer Sister 67    Ovarian cancer Neg Hx        Social History           Tobacco Use    Smoking status: Never Smoker    Smokeless tobacco: Never Used   Substance Use Topics    Alcohol use: No    Drug use: No         Review of Systems:  Review of Systems     OBJECTIVE:      Vital Signs (Most Recent)  Pulse: 70 (09/10/19 1408)  BP: (!) 91/53 (09/10/19 1408)  SpO2: 100 % (09/10/19 1408)  5' 9" (1.753 m)  93.9 kg (207 lb)      Physical Exam:  Physical Exam "   Constitutional: She is oriented to person, place, and time. She appears well-developed and well-nourished.   HENT:   Head: Normocephalic and atraumatic.   Right Ear: External ear normal.   Left Ear: External ear normal.   Nose: Nose normal.   Mouth/Throat: Oropharynx is clear and moist.   Eyes: Pupils are equal, round, and reactive to light. Conjunctivae and EOM are normal.   Neck: Normal range of motion. Neck supple.   Cardiovascular: Normal rate, regular rhythm, normal heart sounds and intact distal pulses.   Pulmonary/Chest: Effort normal and breath sounds normal.   Abdominal: Soft. Bowel sounds are normal.   Musculoskeletal: Normal range of motion.   Neurological: She is alert and oriented to person, place, and time. She has normal reflexes.   Skin: Skin is warm and dry.   Psychiatric: She has a normal mood and affect. Her behavior is normal. Thought content normal.   Vitals reviewed.        Laboratory  none     Diagnostic Results:  none     ASSESSMENT/PLAN:      Right Colon polyp     PLAN:Plan      I will take her to the OR for lap R colectomy with the risks and possible complications were explained

## 2019-10-15 NOTE — PLAN OF CARE
Pt took antibiotics as prescribed last night  and this am.  Pt assistant reports pt completed bowl prep without problem and reports stool is clear

## 2019-10-15 NOTE — TELEPHONE ENCOUNTER
Spoke with Ms. Johnson she said pt took her prep and antibiotics as directed.              ----- Message from Mendy Verde sent at 10/14/2019  4:30 PM CDT -----  Contact: Daya  Type: Patient Call Back    Who called:Nikhil    What is the request in detail:patient is scheduled for surgery tomorrow. Medication was delivered but orders are not present. Nikhil is calling to clarify medication. Please call to advise    Can the clinic reply by [facility]?    Would the patient rather a call back or a response via [facility]? Call    Best call back number:[phone]

## 2019-10-15 NOTE — OP NOTE
[facility] [place]  General Surgery  Operative Note    SUMMARY     Date of Procedure: 10/15/2019     Procedure: Procedure(s) (LRB):  COLECTOMY, RIGHT, LAPAROSCOPIC (N/A)       Surgeon(s) and Role:     * Claeb Ellis MD - Primary    Assisting Surgeon: Klaus Guzman MD    Pre-Operative Diagnosis: Colonic mass [K63.9]    Post-Operative Diagnosis: Post-Op Diagnosis Codes:     * Colonic mass [K63.89]    Anesthesia: General    Technical Procedures Used:  Patient was taken to the operating room placed on operating table in supine position general anesthesia was induced she was placed in lithotomy prepped and draped around her abdomen in the usual sterile fashion.  An incision was made just above and to the left of her umbilicus through which a 5 mm port was inserted under direct vision.  The abdomen was insufflated with 3 have L of CO2 and another port was placed in the left lower quadrant. She has 6 extensive adhesions to anterior abdominal wall and evidence of some portosystemic shunts.  These were taken down sharply as well as bluntly using Harmonic scalpel. The tattoo in the cecum as well as the ascending colon could be identified the entire right colon was then mobilized in a medial fashion off of the patient's abdominal wall. Should be stated that her kidney was then closed juxtaposition to her colon with was able to come off of her with the mesentery being plastered posteriorly.  The mesentery between her distal ileum and heard transverse colon was then divided. Specimen was brought out of the hand port site on the right lower quadrant and the distal ileum as well as transverse colon were divided and a formal side-to-side functional end-to-end anastomosis was then performed. Specimen was opened on the back table and a flat polyp could be seen between the 2 tattoo sites. Same and membrane was placed along the anastomosis anteriorly and posteriorly was dropped back into the patient's abdominal cavity.  Evicel was used for the cut edges because of a low platelet count and then the ports removed and the port sites all closed in layers with absorbable suture. The hand port site as well as the other port sites was injected with Exparel Marcaine. Dermabond tape was placed she was awakened and transported to the recovery room in satisfactory condition.    Description of the Findings of the Procedure:  Cecal polyp with extensive adhesions    Significant Surgical Tasks Conducted by the Assistant(s), if Applicable:  Less than 50%    Complications: No    Estimated Blood Loss (EBL): 100 mL           Implants:   Implant Name Type Inv. Item Serial No.  Lot No. LRB No. Used   [id number]  MEMBRANE AMNIOFIX [id number] [id number]  N/A 1       Specimens:   Specimen (12h ago, onward)     Start     Ordered    10/15/19 1021  Specimen to Pathology - Surgery  Once     Comments:  Pre-op Diagnosis: Colonic mass [K63.9]Procedure(s):COLECTOMY, RIGHT, LAPAROSCOPIC Number of specimens: 1Name of specimens: Cecum      10/15/19 1054                        Condition: Good    Disposition: PACU - hemodynamically stable.    Attestation: I was present and scrubbed for the entire procedure.

## 2019-10-15 NOTE — CARE UPDATE
Pt stated her pain was 6/10 given 0.2mg Dilaudid per order. Pt fell asleep and was difficult to rouse and began obstructing causing O2 sat to drop to low 75%, jaw thrust allowed pt to breathe without obstruction. After continuous rounds of obstruction/jawthrust this author was able to consult with anesthesia and placed NPA and non-rebreather mask at 15L O2, in order to maintain proper levels, as opposed to giving narcan. Pt assessed by ANES several times throughout extended phase 1 recovery

## 2019-10-16 LAB
ALBUMIN SERPL BCP-MCNC: 3.3 G/DL (ref 3.5–5.2)
ALLENS TEST: ABNORMAL
ALP SERPL-CCNC: 222 U/L (ref 55–135)
ALT SERPL W/O P-5'-P-CCNC: 21 U/L (ref 10–44)
AMMONIA PLAS-SCNC: 46 UMOL/L (ref 10–50)
ANION GAP SERPL CALC-SCNC: 8 MMOL/L (ref 8–16)
ANION GAP SERPL CALC-SCNC: 8 MMOL/L (ref 8–16)
AST SERPL-CCNC: 29 U/L (ref 10–40)
BASOPHILS # BLD AUTO: 0.01 K/UL (ref 0–0.2)
BASOPHILS # BLD AUTO: 0.02 K/UL (ref 0–0.2)
BASOPHILS NFR BLD: 0.1 % (ref 0–1.9)
BASOPHILS NFR BLD: 0.2 % (ref 0–1.9)
BILIRUB SERPL-MCNC: 1 MG/DL (ref 0.1–1)
BUN SERPL-MCNC: 20 MG/DL (ref 8–23)
BUN SERPL-MCNC: 20 MG/DL (ref 8–23)
CALCIUM SERPL-MCNC: 8.8 MG/DL (ref 8.7–10.5)
CALCIUM SERPL-MCNC: 8.8 MG/DL (ref 8.7–10.5)
CHLORIDE SERPL-SCNC: 107 MMOL/L (ref 95–110)
CHLORIDE SERPL-SCNC: 107 MMOL/L (ref 95–110)
CO2 SERPL-SCNC: 23 MMOL/L (ref 23–29)
CO2 SERPL-SCNC: 23 MMOL/L (ref 23–29)
CREAT SERPL-MCNC: 0.8 MG/DL (ref 0.5–1.4)
CREAT SERPL-MCNC: 0.8 MG/DL (ref 0.5–1.4)
DELSYS: ABNORMAL
DIFFERENTIAL METHOD: ABNORMAL
DIFFERENTIAL METHOD: ABNORMAL
EOSINOPHIL # BLD AUTO: 0 K/UL (ref 0–0.5)
EOSINOPHIL # BLD AUTO: 0.1 K/UL (ref 0–0.5)
EOSINOPHIL NFR BLD: 0.2 % (ref 0–8)
EOSINOPHIL NFR BLD: 0.5 % (ref 0–8)
ERYTHROCYTE [DISTWIDTH] IN BLOOD BY AUTOMATED COUNT: 16.7 % (ref 11.5–14.5)
ERYTHROCYTE [DISTWIDTH] IN BLOOD BY AUTOMATED COUNT: 16.7 % (ref 11.5–14.5)
EST. GFR  (AFRICAN AMERICAN): >60 ML/MIN/1.73 M^2
EST. GFR  (AFRICAN AMERICAN): >60 ML/MIN/1.73 M^2
EST. GFR  (NON AFRICAN AMERICAN): >60 ML/MIN/1.73 M^2
EST. GFR  (NON AFRICAN AMERICAN): >60 ML/MIN/1.73 M^2
FIO2: 28
FLOW: 2
GLUCOSE SERPL-MCNC: 155 MG/DL (ref 70–110)
GLUCOSE SERPL-MCNC: 155 MG/DL (ref 70–110)
HCO3 UR-SCNC: 24.9 MMOL/L (ref 24–28)
HCT VFR BLD AUTO: 30 % (ref 37–48.5)
HCT VFR BLD AUTO: 31.3 % (ref 37–48.5)
HGB BLD-MCNC: 8.7 G/DL (ref 12–16)
HGB BLD-MCNC: 9.3 G/DL (ref 12–16)
IMM GRANULOCYTES # BLD AUTO: 0.02 K/UL (ref 0–0.04)
IMM GRANULOCYTES # BLD AUTO: 0.05 K/UL (ref 0–0.04)
IMM GRANULOCYTES NFR BLD AUTO: 0.2 % (ref 0–0.5)
IMM GRANULOCYTES NFR BLD AUTO: 0.5 % (ref 0–0.5)
LYMPHOCYTES # BLD AUTO: 0.7 K/UL (ref 1–4.8)
LYMPHOCYTES # BLD AUTO: 1.1 K/UL (ref 1–4.8)
LYMPHOCYTES NFR BLD: 12 % (ref 18–48)
LYMPHOCYTES NFR BLD: 6.1 % (ref 18–48)
MAGNESIUM SERPL-MCNC: 1.8 MG/DL (ref 1.6–2.6)
MCH RBC QN AUTO: 26.4 PG (ref 27–31)
MCH RBC QN AUTO: 26.6 PG (ref 27–31)
MCHC RBC AUTO-ENTMCNC: 29 G/DL (ref 32–36)
MCHC RBC AUTO-ENTMCNC: 29.7 G/DL (ref 32–36)
MCV RBC AUTO: 89 FL (ref 82–98)
MCV RBC AUTO: 91 FL (ref 82–98)
MODE: ABNORMAL
MONOCYTES # BLD AUTO: 0.9 K/UL (ref 0.3–1)
MONOCYTES # BLD AUTO: 0.9 K/UL (ref 0.3–1)
MONOCYTES NFR BLD: 8.5 % (ref 4–15)
MONOCYTES NFR BLD: 9.8 % (ref 4–15)
NEUTROPHILS # BLD AUTO: 7.1 K/UL (ref 1.8–7.7)
NEUTROPHILS # BLD AUTO: 9.2 K/UL (ref 1.8–7.7)
NEUTROPHILS NFR BLD: 77.3 % (ref 38–73)
NEUTROPHILS NFR BLD: 84.6 % (ref 38–73)
NRBC BLD-RTO: 0 /100 WBC
NRBC BLD-RTO: 0 /100 WBC
PCO2 BLDA: 53.5 MMHG (ref 35–45)
PH SMN: 7.28 [PH] (ref 7.35–7.45)
PLATELET # BLD AUTO: 124 K/UL (ref 150–350)
PLATELET # BLD AUTO: 91 K/UL (ref 150–350)
PMV BLD AUTO: 10.5 FL (ref 9.2–12.9)
PMV BLD AUTO: 10.6 FL (ref 9.2–12.9)
PO2 BLDA: 131 MMHG (ref 80–100)
POC BE: -2 MMOL/L
POC SATURATED O2: 98 % (ref 95–100)
POC TCO2: 26 MMOL/L (ref 23–27)
POCT GLUCOSE: 133 MG/DL (ref 70–110)
POCT GLUCOSE: 160 MG/DL (ref 70–110)
POCT GLUCOSE: 162 MG/DL (ref 70–110)
POCT GLUCOSE: 162 MG/DL (ref 70–110)
POCT GLUCOSE: 164 MG/DL (ref 70–110)
POCT GLUCOSE: 186 MG/DL (ref 70–110)
POTASSIUM SERPL-SCNC: 4.7 MMOL/L (ref 3.5–5.1)
POTASSIUM SERPL-SCNC: 4.7 MMOL/L (ref 3.5–5.1)
PROT SERPL-MCNC: 6.7 G/DL (ref 6–8.4)
RBC # BLD AUTO: 3.29 M/UL (ref 4–5.4)
RBC # BLD AUTO: 3.5 M/UL (ref 4–5.4)
SAMPLE: ABNORMAL
SITE: ABNORMAL
SODIUM SERPL-SCNC: 138 MMOL/L (ref 136–145)
SODIUM SERPL-SCNC: 138 MMOL/L (ref 136–145)
SP02: 97
WBC # BLD AUTO: 10.87 K/UL (ref 3.9–12.7)
WBC # BLD AUTO: 9.14 K/UL (ref 3.9–12.7)

## 2019-10-16 PROCEDURE — 97162 PT EVAL MOD COMPLEX 30 MIN: CPT

## 2019-10-16 PROCEDURE — 94761 N-INVAS EAR/PLS OXIMETRY MLT: CPT

## 2019-10-16 PROCEDURE — 63600175 PHARM REV CODE 636 W HCPCS: Performed by: STUDENT IN AN ORGANIZED HEALTH CARE EDUCATION/TRAINING PROGRAM

## 2019-10-16 PROCEDURE — 99900035 HC TECH TIME PER 15 MIN (STAT)

## 2019-10-16 PROCEDURE — 82803 BLOOD GASES ANY COMBINATION: CPT

## 2019-10-16 PROCEDURE — 80048 BASIC METABOLIC PNL TOTAL CA: CPT

## 2019-10-16 PROCEDURE — 83605 ASSAY OF LACTIC ACID: CPT

## 2019-10-16 PROCEDURE — 93010 EKG 12-LEAD: ICD-10-PCS | Mod: ,,, | Performed by: INTERNAL MEDICINE

## 2019-10-16 PROCEDURE — 20000000 HC ICU ROOM

## 2019-10-16 PROCEDURE — 94799 UNLISTED PULMONARY SVC/PX: CPT

## 2019-10-16 PROCEDURE — 25000003 PHARM REV CODE 250

## 2019-10-16 PROCEDURE — 93005 ELECTROCARDIOGRAM TRACING: CPT

## 2019-10-16 PROCEDURE — 97110 THERAPEUTIC EXERCISES: CPT

## 2019-10-16 PROCEDURE — 36600 WITHDRAWAL OF ARTERIAL BLOOD: CPT

## 2019-10-16 PROCEDURE — 25000242 PHARM REV CODE 250 ALT 637 W/ HCPCS: Performed by: STUDENT IN AN ORGANIZED HEALTH CARE EDUCATION/TRAINING PROGRAM

## 2019-10-16 PROCEDURE — 82140 ASSAY OF AMMONIA: CPT

## 2019-10-16 PROCEDURE — 83735 ASSAY OF MAGNESIUM: CPT

## 2019-10-16 PROCEDURE — 85025 COMPLETE CBC W/AUTO DIFF WBC: CPT

## 2019-10-16 PROCEDURE — 27000221 HC OXYGEN, UP TO 24 HOURS

## 2019-10-16 PROCEDURE — 25000003 PHARM REV CODE 250: Performed by: SURGERY

## 2019-10-16 PROCEDURE — 63600175 PHARM REV CODE 636 W HCPCS

## 2019-10-16 PROCEDURE — 25000003 PHARM REV CODE 250: Performed by: STUDENT IN AN ORGANIZED HEALTH CARE EDUCATION/TRAINING PROGRAM

## 2019-10-16 PROCEDURE — 36415 COLL VENOUS BLD VENIPUNCTURE: CPT

## 2019-10-16 PROCEDURE — 80053 COMPREHEN METABOLIC PANEL: CPT

## 2019-10-16 PROCEDURE — 93010 ELECTROCARDIOGRAM REPORT: CPT | Mod: ,,, | Performed by: INTERNAL MEDICINE

## 2019-10-16 RX ORDER — PANTOPRAZOLE SODIUM 40 MG/1
40 TABLET, DELAYED RELEASE ORAL DAILY
Status: DISCONTINUED | OUTPATIENT
Start: 2019-10-16 | End: 2019-10-21

## 2019-10-16 RX ORDER — NALOXONE HCL 0.4 MG/ML
VIAL (ML) INJECTION
Status: COMPLETED
Start: 2019-10-16 | End: 2019-10-16

## 2019-10-16 RX ORDER — CEFAZOLIN SODIUM 1 G/50ML
1 SOLUTION INTRAVENOUS
Status: COMPLETED | OUTPATIENT
Start: 2019-10-16 | End: 2019-10-16

## 2019-10-16 RX ORDER — ATORVASTATIN CALCIUM 10 MG/1
10 TABLET, FILM COATED ORAL DAILY
Status: DISCONTINUED | OUTPATIENT
Start: 2019-10-17 | End: 2019-10-23 | Stop reason: HOSPADM

## 2019-10-16 RX ORDER — METOPROLOL TARTRATE 1 MG/ML
5 INJECTION, SOLUTION INTRAVENOUS EVERY 5 MIN PRN
Status: DISCONTINUED | OUTPATIENT
Start: 2019-10-16 | End: 2019-10-16

## 2019-10-16 RX ORDER — SENNOSIDES 8.6 MG/1
8.6 TABLET ORAL DAILY
Status: DISCONTINUED | OUTPATIENT
Start: 2019-10-16 | End: 2019-10-23 | Stop reason: HOSPADM

## 2019-10-16 RX ORDER — ACETAMINOPHEN 325 MG/1
650 TABLET ORAL EVERY 6 HOURS SCHEDULED
Status: DISCONTINUED | OUTPATIENT
Start: 2019-10-16 | End: 2019-10-23 | Stop reason: HOSPADM

## 2019-10-16 RX ORDER — NALOXONE HCL 0.4 MG/ML
0.4 VIAL (ML) INJECTION ONCE
Status: COMPLETED | OUTPATIENT
Start: 2019-10-16 | End: 2019-10-16

## 2019-10-16 RX ORDER — DOCUSATE SODIUM 100 MG/1
100 CAPSULE, LIQUID FILLED ORAL 2 TIMES DAILY
Status: DISCONTINUED | OUTPATIENT
Start: 2019-10-16 | End: 2019-10-23 | Stop reason: HOSPADM

## 2019-10-16 RX ORDER — OXYCODONE AND ACETAMINOPHEN 5; 325 MG/1; MG/1
1 TABLET ORAL EVERY 4 HOURS PRN
Qty: 30 TABLET | Refills: 0 | Status: SHIPPED | OUTPATIENT
Start: 2019-10-16 | End: 2019-10-22 | Stop reason: HOSPADM

## 2019-10-16 RX ORDER — NALOXONE HCL 0.4 MG/ML
0.4 VIAL (ML) INJECTION ONCE
Status: COMPLETED | OUTPATIENT
Start: 2019-10-17 | End: 2019-10-17

## 2019-10-16 RX ORDER — NALOXONE HCL 0.4 MG/ML
VIAL (ML) INJECTION
Status: DISCONTINUED
Start: 2019-10-16 | End: 2019-10-16 | Stop reason: WASHOUT

## 2019-10-16 RX ORDER — METOPROLOL TARTRATE 1 MG/ML
10 INJECTION, SOLUTION INTRAVENOUS EVERY 6 HOURS PRN
Status: DISCONTINUED | OUTPATIENT
Start: 2019-10-16 | End: 2019-10-17

## 2019-10-16 RX ORDER — ENOXAPARIN SODIUM 100 MG/ML
40 INJECTION SUBCUTANEOUS EVERY 24 HOURS
Status: DISCONTINUED | OUTPATIENT
Start: 2019-10-16 | End: 2019-10-16

## 2019-10-16 RX ORDER — IPRATROPIUM BROMIDE AND ALBUTEROL SULFATE 2.5; .5 MG/3ML; MG/3ML
3 SOLUTION RESPIRATORY (INHALATION) EVERY 6 HOURS
Status: DISCONTINUED | OUTPATIENT
Start: 2019-10-16 | End: 2019-10-23 | Stop reason: HOSPADM

## 2019-10-16 RX ORDER — FUROSEMIDE 40 MG/1
40 TABLET ORAL DAILY
Status: DISCONTINUED | OUTPATIENT
Start: 2019-10-16 | End: 2019-10-17

## 2019-10-16 RX ORDER — GABAPENTIN 300 MG/1
300 CAPSULE ORAL 3 TIMES DAILY
Status: DISCONTINUED | OUTPATIENT
Start: 2019-10-16 | End: 2019-10-17

## 2019-10-16 RX ORDER — KETOROLAC TROMETHAMINE 30 MG/ML
15 INJECTION, SOLUTION INTRAMUSCULAR; INTRAVENOUS EVERY 6 HOURS
Status: COMPLETED | OUTPATIENT
Start: 2019-10-16 | End: 2019-10-19

## 2019-10-16 RX ORDER — FUROSEMIDE 40 MG/1
40 TABLET ORAL DAILY
Status: DISCONTINUED | OUTPATIENT
Start: 2019-10-17 | End: 2019-10-16

## 2019-10-16 RX ADMIN — MIRTAZAPINE 7.5 MG: 7.5 TABLET ORAL at 08:10

## 2019-10-16 RX ADMIN — METOPROLOL TARTRATE 5 MG: 1 INJECTION, SOLUTION INTRAVENOUS at 01:10

## 2019-10-16 RX ADMIN — ENOXAPARIN SODIUM 40 MG: 100 INJECTION SUBCUTANEOUS at 05:10

## 2019-10-16 RX ADMIN — GABAPENTIN 300 MG: 300 CAPSULE ORAL at 09:10

## 2019-10-16 RX ADMIN — PANTOPRAZOLE SODIUM 40 MG: 40 TABLET, DELAYED RELEASE ORAL at 08:10

## 2019-10-16 RX ADMIN — ACETAMINOPHEN 650 MG: 325 TABLET, FILM COATED ORAL at 12:10

## 2019-10-16 RX ADMIN — ACETAMINOPHEN 650 MG: 325 TABLET, FILM COATED ORAL at 05:10

## 2019-10-16 RX ADMIN — SENNOSIDES 8.6 MG: 8.6 TABLET, FILM COATED ORAL at 08:10

## 2019-10-16 RX ADMIN — KETOROLAC TROMETHAMINE 15 MG: 30 INJECTION, SOLUTION INTRAMUSCULAR at 05:10

## 2019-10-16 RX ADMIN — MUPIROCIN 1 G: 20 OINTMENT TOPICAL at 09:10

## 2019-10-16 RX ADMIN — NALOXONE HYDROCHLORIDE: 0.4 INJECTION, SOLUTION INTRAMUSCULAR; INTRAVENOUS; SUBCUTANEOUS at 10:10

## 2019-10-16 RX ADMIN — CEFAZOLIN SODIUM 1 G: 1 SOLUTION INTRAVENOUS at 07:10

## 2019-10-16 RX ADMIN — IPRATROPIUM BROMIDE AND ALBUTEROL SULFATE 3 ML: .5; 3 SOLUTION RESPIRATORY (INHALATION) at 07:10

## 2019-10-16 RX ADMIN — FUROSEMIDE 40 MG: 40 TABLET ORAL at 05:10

## 2019-10-16 RX ADMIN — GABAPENTIN 300 MG: 300 CAPSULE ORAL at 03:10

## 2019-10-16 RX ADMIN — SODIUM CHLORIDE: 0.9 INJECTION, SOLUTION INTRAVENOUS at 09:10

## 2019-10-16 RX ADMIN — ACETAMINOPHEN 650 MG: 325 TABLET, FILM COATED ORAL at 06:10

## 2019-10-16 RX ADMIN — METOPROLOL SUCCINATE 50 MG: 50 TABLET, EXTENDED RELEASE ORAL at 09:10

## 2019-10-16 RX ADMIN — ESCITALOPRAM OXALATE 10 MG: 10 TABLET ORAL at 08:10

## 2019-10-16 RX ADMIN — GABAPENTIN 300 MG: 300 CAPSULE ORAL at 08:10

## 2019-10-16 RX ADMIN — DOCUSATE SODIUM 100 MG: 100 CAPSULE, LIQUID FILLED ORAL at 08:10

## 2019-10-16 NOTE — PROGRESS NOTES
[facility]  General Surgery  Progress Note    Subjective:     Interval History:   Very sleepy this morning. arousable but delayed responses.     Post-Op Info:  Procedure(s) (LRB):  COLECTOMY, RIGHT, LAPAROSCOPIC (N/A)   1 Day Post-Op      Medications:  Continuous Infusions:   sodium chloride 0.9% 75 mL/hr at 10/16/19 0642     Scheduled Meds:   acetaminophen  650 mg Oral 4 times per day    ceFAZolin (ANCEF) IVPB  1 g Intravenous Q12H    docusate sodium  100 mg Oral BID    enoxaparin  40 mg Subcutaneous Daily    escitalopram oxalate  10 mg Oral Daily    gabapentin  300 mg Oral TID    lidocaine (PF) 10 mg/ml (1%)  1 mL Intradermal Once    metoprolol succinate  50 mg Oral Daily    mirtazapine  7.5 mg Oral Daily    mupirocin  1 g Nasal BID    pantoprazole  40 mg Oral Daily    senna  8.6 mg Oral Daily     PRN Meds:dextrose 50%, dextrose 50%, glucagon (human recombinant), glucose, glucose, influenza, insulin aspart U-100, metoprolol, pneumoc 13-henrique conj-dip cr(PF), promethazine (PHENERGAN) IVPB     Objective:     Vital Signs (Most Recent):  Temp: 99.9 °F (37.7 °C) (10/16/19 0437)  Pulse: (!) 111 (10/16/19 0437)  Resp: 19 (10/16/19 0437)  BP: (!) 123/59 (10/16/19 0437)  SpO2: 96 % (10/16/19 0437) Vital Signs (24h Range):  Temp:  [98.2 °F (36.8 °C)-99.9 °F (37.7 °C)] 99.9 °F (37.7 °C)  Pulse:  [] 111  Resp:  [10-31] 19  SpO2:  [85 %-100 %] 96 %  BP: (101-158)/(49-78) 123/59  Arterial Line BP: ()/(48-92) 94/52       Intake/Output Summary (Last 24 hours) at 10/16/2019 0720  Last data filed at 10/16/2019 0640  Gross per 24 hour   Intake 3108.33 ml   Output 320 ml   Net 2788.33 ml       Physical Exam  abd- soft, appropriately tender. Incisions c/d/i   Significant Labs:  CBC:   Recent Labs   Lab 10/16/19  0515   WBC 10.87   RBC 3.50*   HGB 9.3*   HCT 31.3*   *   MCV 89   MCH 26.6*   MCHC 29.7*     CMP:   Recent Labs   Lab 10/16/19  0515   *  155*   CALCIUM 8.8  8.8    ALBUMIN 3.3*   PROT 6.7     138   K 4.7  4.7   CO2 23  23     107   BUN 20  20   CREATININE 0.8  0.8   ALKPHOS 222*   ALT 21   AST 29   BILITOT 1.0       Significant Diagnostics:  None    Assessment/Plan:     Active Diagnoses:    Diagnosis Date Noted POA    PRINCIPAL PROBLEM:  Colonic mass [K63.89] 10/15/2019 Yes      Problems Resolved During this Admission:     73 YOF POD 1 s/p lap right colectomy. HD stable. Labs wnl. Had one episode of a fib which resolved with metoprolol.     CLD  D/c narcotics  IVF  Hernandez reinserted for urinary retention   PT/OT  IS  Lovenox/PPI      Klaus Guzman MD  General Surgery  [facility] [place]

## 2019-10-16 NOTE — PLAN OF CARE
10/16/19 1406   Discharge Assessment   Assessment Type Discharge Planning Assessment   Confirmed/corrected address and phone number on facesheet? No   Assessment information obtained from? Medical Record   Communicated expected length of stay with patient/caregiver no   Prior to hospitilization cognitive status: Alert/Oriented   Prior to hospitalization functional status: Needs Assistance   Current cognitive status: Alert/Oriented   Current Functional Status: Needs Assistance   Facility Arrived From:   ([facility])   Lives With facility resident   Able to Return to Prior Arrangements yes   Is patient able to care for self after discharge? Unable to determine at this time (comments)   Who are your caregiver(s) and their phone number(s)?   (Sister, Reyna Alvarez [phone]; Facility resident)   Patient's perception of discharge disposition nursing home  ([facility] )   Readmission Within the Last 30 Days no previous admission in last 30 days   Patient currently being followed by outpatient case management? No   Patient currently receives any other outside agency services? No   Do you have any problems affording any of your prescribed medications? No   Is the patient taking medications as prescribed? yes   Does the patient have transportation home? Yes   Transportation Anticipated agency   Does the patient receive services at the [facility]? No   Discharge Plan A Return to nursing home   DME Needed Upon Discharge    (TBD)   Patient/Family in Agreement with Plan unable to assess

## 2019-10-16 NOTE — PROGRESS NOTES
1222 TN sent a message to Dr. Ellis's office to schedule a post-up appt in 2 weeks from 10/15/19.    1428 Dr. Ellis's staff scheduled surgery post-up f/u appt on 10/29/19 at 1:30 PM.

## 2019-10-16 NOTE — PT/OT/SLP EVAL
Physical Therapy Evaluation and Discharge Note    Patient Name:  Ivan Cruz   MRN:  [id number]    Recommendations:     Discharge Recommendations:  (back to [facility])   Discharge Equipment Recommendations: none   Barriers to discharge: None    Assessment:     Ivan Cruz is a 73 y.o. female admitted with a medical diagnosis of Colonic mass.  At this time, patient is functioning at their prior level of function and does not require further acute PT services.     Recent Surgery: Procedure(s) (LRB):  COLECTOMY, RIGHT, LAPAROSCOPIC (N/A) 1 Day Post-Op    Plan:     During this hospitalization, patient does not require further acute PT services.  Please re-consult if situation changes.      Subjective     Chief Complaint: pain all over  Patient/Family Comments/goals: Pt stated that she's at the wrong hospital.    Pain/Comfort:  · Pain Rating 1: (Pt c/o pain everywhere.)  · Pain Addressed 1: (Nurse Melvi unable to be reached)    Living Environment:  Pt unable to provide information at this time.  PT unable to reach staff at [facility] after multiple phone calls.  PT was able to speak with pt's sister, Ms. Odell, who was able to provide some information.  Sister reported that pt just started giving up and has been nonambulatory for over a year.    Pt reported that she is a resident at [facility] since December 2018.   Prior to admission, patients level of function was nonambulatory and required renae lift for w/c transfers.  Pt does not propel w/c.  Pt required total care except for feeding.  Equipment used at home: (DME's provided by NH).  Upon discharge, patient will have assistance from NH staff.    Objective:     Patient found HOB elevated with pulse ox (continuous), peripheral IV, pressure relief boots, SCD upon PT entry to room.    General Precautions: Standard, fall(pressure injury)   Orthopedic Precautions:(abdominal precautions)   Braces: N/A     Exams:  · Cognitive Exam:  Patient is oriented to  Person.  Pt able to state  and her age.   · Gross Motor Coordination:  impaired  · Postural Exam:  Patient presented with the following abnormalities:    · -       No postural abnormalities identified  · Skin Integrity/Edema:      · -       Skin integrity: Visible skin intact and abdominal dressings intact  · -       Edema: Mild BLE  · BUE ROM: ROM WFL except decreased shoulder flex and R fingers extension  · BUE Strength: unable to formally assess 2* pt's MS  · BLE ROM: ROM limited with B hip flex, knee ext/flex, and ankle DF/PF; Pt with rigidity in BLE.    · BLE Strength: unable to formally assess 2* pt's MS    Functional Mobility:  Pt was re-oriented to place and situation, however kept stating that she's at the wrong hospital.  Pt asking to go back to [facility] and looking for her sister Cass.  Pt was fixated on having continuous pulse ox removed.  spO2 RA ~99% and HR ~89 bpm.    · Bed Mobility:     · Rolling Left:  dependence  · Rolling Right: dependence  · Scooting: dependence and of 2 persons  · Bridging: dependence and of 2 persons  · Supine to Sit: dependence and of 2 persons with HOB elevated; Pt able to achieve upright posture, however with posterior trunk lean.     · Sit to Supine: dependence and of 2 persons  · Transfers:     · Sit to Stand:  dependence and of 2 persons with rolling walker  · Balance: Pt with fair static sit balance and poor static stand balance.    AM-PAC 6 CLICK MOBILITY  Total Score:9       Therapeutic Activities and Exercises:  PROM BUE/BLE in all available planes.  Pt with rigidity to BUE/BLE.      PROM trunk flex/ext while sitting EOB    AM-PAC 6 CLICK MOBILITY  Total Score:9     Patient left left sidelying and HOB elevated with all lines intact, call button in reach, bed alarm on and unable to reach nurse Melvi.  B pressure relief boots and SCD placed.  Pt's sister, Ms. Odell was updated on pt's status.      GOALS:   Multidisciplinary Problems     Physical Therapy Goals      Not on file          Multidisciplinary Problems (Resolved)        Problem: Physical Therapy Goal    Goal Priority Disciplines Outcome Goal Variances Interventions   Physical Therapy Goal   (Resolved)     PT, PT/OT Met                     History:     Past Medical History:   Diagnosis Date    Arthritis     Atrial fibrillation     Chronic heart failure     Coronary artery disease     Diabetes mellitus     Hypercholesteremia     Hypertension     Pancytopenia        Past Surgical History:   Procedure Laterality Date    APPENDECTOMY      BACK SURGERY      CHOLECYSTECTOMY      CORONARY ARTERY BYPASS GRAFT      TUBAL LIGATION         Time Tracking:     PT Received On: 10/16/19  PT Start Time: 0946     PT Stop Time: 1011  PT Total Time (min): 25 min     Billable Minutes: Evaluation 15 min and Therapeutic Exercise 10 min      Payton Carballo PT  10/16/2019

## 2019-10-16 NOTE — ANESTHESIA POSTPROCEDURE EVALUATION
Anesthesia Post Evaluation    Patient: Ivan Cruz    Procedure(s) Performed: Procedure(s) (LRB):  COLECTOMY, RIGHT, LAPAROSCOPIC (N/A)    Final Anesthesia Type: general  Patient location during evaluation: PACU  Patient participation: Yes- Able to Participate  Level of consciousness: awake and alert (pt awakens to voice)  Post-procedure vital signs: reviewed and stable  Pain management: adequate  Airway patency: patent  PONV status at discharge: No PONV  Anesthetic complications: no      Cardiovascular status: blood pressure returned to baseline and hemodynamically stable  Respiratory status: unassisted and spontaneous ventilation  Hydration status: euvolemic  Follow-up not needed.          Vitals Value Taken Time   /53 10/16/2019 11:06 AM   Temp 38.2 °C (100.8 °F) 10/16/2019 11:06 AM   Pulse 96 10/16/2019 11:06 AM   Resp 20 10/16/2019 11:06 AM   SpO2 99 % 10/16/2019 11:06 AM         Event Time     Out of Recovery 14:50:00          Pain/Zak Score: Pain Rating Prior to Med Admin: 6 (10/16/2019 11:50 AM)  Pain Rating Post Med Admin: 4 (10/15/2019  9:29 PM)  Zak Score: 9 (10/15/2019 11:31 AM)

## 2019-10-16 NOTE — PLAN OF CARE
Patient medicated once for pain throughout shift.  Patient due to void so Hernandez catheter placed.  Patient tachycardiac throughout shift.  Physician notified medications on plan for heart rated.    Problem: Adult Inpatient Plan of Care  Goal: Plan of Care Review  Outcome: Ongoing, Progressing  Flowsheets (Taken 10/16/2019 0804)  Plan of Care Reviewed With: patient     Problem: Adult Inpatient Plan of Care  Goal: Patient-Specific Goal (Individualization)  Outcome: Ongoing, Not Progressing  Goal: Absence of Hospital-Acquired Illness or Injury  Outcome: Ongoing, Not Progressing  Goal: Optimal Comfort and Wellbeing  Outcome: Ongoing, Not Progressing  Goal: Readiness for Transition of Care  Outcome: Ongoing, Not Progressing  Goal: Rounds/Family Conference  Outcome: Ongoing, Not Progressing     Patient has continuous pulse ox.  No injuries noted throughout shift.

## 2019-10-16 NOTE — PLAN OF CARE
Problem: Physical Therapy Goal  Goal: Physical Therapy Goal  Outcome: Met    Pt is a resident at [facility], nonambulatory, and requires renae lift for transfers.  Pt at prior level of function, not appropriate for acute skilled PT services at this time.

## 2019-10-17 LAB
ALLENS TEST: ABNORMAL
ANION GAP SERPL CALC-SCNC: 6 MMOL/L (ref 8–16)
BUN SERPL-MCNC: 22 MG/DL (ref 8–23)
CALCIUM SERPL-MCNC: 8.9 MG/DL (ref 8.7–10.5)
CHLORIDE SERPL-SCNC: 108 MMOL/L (ref 95–110)
CO2 SERPL-SCNC: 23 MMOL/L (ref 23–29)
CREAT SERPL-MCNC: 1 MG/DL (ref 0.5–1.4)
DELSYS: ABNORMAL
ERYTHROCYTE [SEDIMENTATION RATE] IN BLOOD BY WESTERGREN METHOD: 12 MM/H
EST. GFR  (AFRICAN AMERICAN): >60 ML/MIN/1.73 M^2
EST. GFR  (NON AFRICAN AMERICAN): 56 ML/MIN/1.73 M^2
FIO2: 40
FLOW: 15
GLUCOSE SERPL-MCNC: 151 MG/DL (ref 70–110)
HCO3 UR-SCNC: 23.9 MMOL/L (ref 24–28)
LACTATE SERPL-SCNC: 1.2 MMOL/L (ref 0.5–2.2)
MODE: ABNORMAL
PCO2 BLDA: 58.3 MMHG (ref 35–45)
PH SMN: 7.22 [PH] (ref 7.35–7.45)
PO2 BLDA: 155 MMHG (ref 80–100)
POC BE: -4 MMOL/L
POC SATURATED O2: 99 % (ref 95–100)
POC TCO2: 26 MMOL/L (ref 23–27)
POCT GLUCOSE: 147 MG/DL (ref 70–110)
POCT GLUCOSE: 151 MG/DL (ref 70–110)
POCT GLUCOSE: 214 MG/DL (ref 70–110)
POCT GLUCOSE: 226 MG/DL (ref 70–110)
POTASSIUM SERPL-SCNC: 4 MMOL/L (ref 3.5–5.1)
SAMPLE: ABNORMAL
SITE: ABNORMAL
SODIUM SERPL-SCNC: 137 MMOL/L (ref 136–145)
SP02: 99

## 2019-10-17 PROCEDURE — 94761 N-INVAS EAR/PLS OXIMETRY MLT: CPT

## 2019-10-17 PROCEDURE — 63600175 PHARM REV CODE 636 W HCPCS: Performed by: SURGERY

## 2019-10-17 PROCEDURE — 94640 AIRWAY INHALATION TREATMENT: CPT

## 2019-10-17 PROCEDURE — 82803 BLOOD GASES ANY COMBINATION: CPT

## 2019-10-17 PROCEDURE — 20000000 HC ICU ROOM

## 2019-10-17 PROCEDURE — 36600 WITHDRAWAL OF ARTERIAL BLOOD: CPT

## 2019-10-17 PROCEDURE — 25000003 PHARM REV CODE 250

## 2019-10-17 PROCEDURE — 99222 1ST HOSP IP/OBS MODERATE 55: CPT | Mod: ,,, | Performed by: PSYCHIATRY & NEUROLOGY

## 2019-10-17 PROCEDURE — 63600175 PHARM REV CODE 636 W HCPCS: Performed by: STUDENT IN AN ORGANIZED HEALTH CARE EDUCATION/TRAINING PROGRAM

## 2019-10-17 PROCEDURE — 99222 PR INITIAL HOSPITAL CARE,LEVL II: ICD-10-PCS | Mod: ,,, | Performed by: PSYCHIATRY & NEUROLOGY

## 2019-10-17 PROCEDURE — P9045 ALBUMIN (HUMAN), 5%, 250 ML: HCPCS | Performed by: SURGERY

## 2019-10-17 PROCEDURE — 63600175 PHARM REV CODE 636 W HCPCS

## 2019-10-17 PROCEDURE — 27000221 HC OXYGEN, UP TO 24 HOURS

## 2019-10-17 PROCEDURE — 25000003 PHARM REV CODE 250: Performed by: SURGERY

## 2019-10-17 PROCEDURE — S5010 5% DEXTROSE AND 0.45% SALINE: HCPCS | Performed by: SURGERY

## 2019-10-17 PROCEDURE — 25000242 PHARM REV CODE 250 ALT 637 W/ HCPCS: Performed by: STUDENT IN AN ORGANIZED HEALTH CARE EDUCATION/TRAINING PROGRAM

## 2019-10-17 PROCEDURE — 25000003 PHARM REV CODE 250: Performed by: STUDENT IN AN ORGANIZED HEALTH CARE EDUCATION/TRAINING PROGRAM

## 2019-10-17 PROCEDURE — 99900035 HC TECH TIME PER 15 MIN (STAT)

## 2019-10-17 RX ORDER — METOPROLOL TARTRATE 1 MG/ML
10 INJECTION, SOLUTION INTRAVENOUS EVERY 6 HOURS PRN
Status: DISCONTINUED | OUTPATIENT
Start: 2019-10-17 | End: 2019-10-23 | Stop reason: HOSPADM

## 2019-10-17 RX ORDER — DEXTROSE MONOHYDRATE AND SODIUM CHLORIDE 5; .45 G/100ML; G/100ML
INJECTION, SOLUTION INTRAVENOUS CONTINUOUS
Status: DISCONTINUED | OUTPATIENT
Start: 2019-10-17 | End: 2019-10-19

## 2019-10-17 RX ORDER — ALBUMIN HUMAN 50 G/1000ML
25 SOLUTION INTRAVENOUS ONCE
Status: COMPLETED | OUTPATIENT
Start: 2019-10-17 | End: 2019-10-17

## 2019-10-17 RX ORDER — FUROSEMIDE 10 MG/ML
40 INJECTION INTRAMUSCULAR; INTRAVENOUS ONCE
Status: COMPLETED | OUTPATIENT
Start: 2019-10-17 | End: 2019-10-17

## 2019-10-17 RX ADMIN — DEXTROSE AND SODIUM CHLORIDE: 5; .45 INJECTION, SOLUTION INTRAVENOUS at 10:10

## 2019-10-17 RX ADMIN — KETOROLAC TROMETHAMINE 15 MG: 30 INJECTION, SOLUTION INTRAMUSCULAR at 01:10

## 2019-10-17 RX ADMIN — ALBUMIN (HUMAN) 25 G: 12.5 SOLUTION INTRAVENOUS at 12:10

## 2019-10-17 RX ADMIN — METOPROLOL TARTRATE 10 MG: 1 INJECTION, SOLUTION INTRAVENOUS at 01:10

## 2019-10-17 RX ADMIN — METOPROLOL SUCCINATE 50 MG: 50 TABLET, EXTENDED RELEASE ORAL at 10:10

## 2019-10-17 RX ADMIN — KETOROLAC TROMETHAMINE 15 MG: 30 INJECTION, SOLUTION INTRAMUSCULAR at 11:10

## 2019-10-17 RX ADMIN — INSULIN ASPART 2 UNITS: 100 INJECTION, SOLUTION INTRAVENOUS; SUBCUTANEOUS at 06:10

## 2019-10-17 RX ADMIN — MUPIROCIN 1 G: 20 OINTMENT TOPICAL at 08:10

## 2019-10-17 RX ADMIN — IPRATROPIUM BROMIDE AND ALBUTEROL SULFATE 3 ML: .5; 3 SOLUTION RESPIRATORY (INHALATION) at 02:10

## 2019-10-17 RX ADMIN — MUPIROCIN 1 G: 20 OINTMENT TOPICAL at 09:10

## 2019-10-17 RX ADMIN — INSULIN ASPART 1 UNITS: 100 INJECTION, SOLUTION INTRAVENOUS; SUBCUTANEOUS at 09:10

## 2019-10-17 RX ADMIN — KETOROLAC TROMETHAMINE 15 MG: 30 INJECTION, SOLUTION INTRAMUSCULAR at 12:10

## 2019-10-17 RX ADMIN — IPRATROPIUM BROMIDE AND ALBUTEROL SULFATE 3 ML: .5; 3 SOLUTION RESPIRATORY (INHALATION) at 09:10

## 2019-10-17 RX ADMIN — SODIUM CHLORIDE 500 ML: 0.9 INJECTION, SOLUTION INTRAVENOUS at 12:10

## 2019-10-17 RX ADMIN — APIXABAN 5 MG: 5 TABLET, FILM COATED ORAL at 10:10

## 2019-10-17 RX ADMIN — KETOROLAC TROMETHAMINE 15 MG: 30 INJECTION, SOLUTION INTRAMUSCULAR at 06:10

## 2019-10-17 RX ADMIN — IPRATROPIUM BROMIDE AND ALBUTEROL SULFATE 3 ML: .5; 3 SOLUTION RESPIRATORY (INHALATION) at 12:10

## 2019-10-17 RX ADMIN — IPRATROPIUM BROMIDE AND ALBUTEROL SULFATE 3 ML: .5; 3 SOLUTION RESPIRATORY (INHALATION) at 08:10

## 2019-10-17 RX ADMIN — FUROSEMIDE 40 MG: 10 INJECTION, SOLUTION INTRAVENOUS at 08:10

## 2019-10-17 RX ADMIN — DEXTROSE AND SODIUM CHLORIDE: 5; .45 INJECTION, SOLUTION INTRAVENOUS at 04:10

## 2019-10-17 NOTE — PROGRESS NOTES
Telephone message left to notify family patient transferred to ICU but is now stable.  Left message at [phone]

## 2019-10-17 NOTE — CARE UPDATE
[facility]  ICU Multidisciplinary Bedside Rounds   SUMMARY     Date: 10/17/2019    Prehospitalization: Nursing Home  Admit Date / LOS : 10/15/2019/ 2 days    Diagnosis: Colonic mass    Consults:        Active: Neuro       Needed: OT and PT     Code Status: Prior   Advanced Directive: Not Received    LDA: Hernandez       Central Lines/Site/Justification:Patient Does Not Have Central Line       Urinary Cath/Order/Justification:Post Operative    Vasopressors/Infusions:        GOALS: Volume/ Hemodynamic: N/A                     RASS: 0  alert and calm    CAM ICU: Negative  Pain Management: none       Pain Controlled: no     Rhythm: A-Fib    Respiratory Device: Nasal Cannula                  Most Recent SBT/ SAT: Does not meet criteria       MOVE Screen: PASS    VTE Prophylaxis: Mechanical  Mobility: Bedrest  Stress Ulcer Prophylaxis: Yes    Dietary: PO  Tolerance: yes  /  Advancement: no    Isolation: No active isolations    Restraints: No    Significant Dates:  Post Op Date: 10/15/19---Lap Colectomy  Rescue Date: 10/16/19 due to patient being lethargic  Imaging/ Diagnostics: 10/17/19 CT of Head- negative    Noteworthy Labs:  none    CBC/Anemia Labs: Coags:    Recent Labs   Lab 10/16/19  0515 10/16/19  2341   WBC 10.87 9.14   HGB 9.3* 8.7*   HCT 31.3* 30.0*   * 91*   MCV 89 91   RDW 16.7* 16.7*    No results for input(s): PT, INR, APTT in the last 168 hours.     Chemistries:   Recent Labs   Lab 10/16/19  0515 10/16/19  2341     138 137   K 4.7  4.7 4.0     107 108   CO2 23  23 23   BUN 20  20 22   CREATININE 0.8  0.8 1.0   CALCIUM 8.8  8.8 8.9   PROT 6.7  --    BILITOT 1.0  --    ALKPHOS 222*  --    ALT 21  --    AST 29  --    MG 1.8  --         Cardiac Enzymes: Ejection Fractions:    No results for input(s): CPK, CPKMB, MB, TROPONINI in the last 72 hours. No results found for: EF     POCT Glucose: HbA1c:    Recent Labs   Lab 10/16/19  0731 10/16/19  1107 10/16/19  6980  10/16/19  1947 10/16/19  2038 10/16/19  2212   POCTGLUCOSE 160* 186* 164* 162* 133* 162*    No results found for: HGBA1C     Needs from Care Team: none     ICU LOS 5h  Level of Care: OK to Transfer

## 2019-10-17 NOTE — PROGRESS NOTES
[facility]  General Surgery  Progress Note    Subjective:     Interval History:   Transferred to ICU overnight due to AMS .    Post-Op Info:  Procedure(s) (LRB):  COLECTOMY, RIGHT, LAPAROSCOPIC (N/A)   2 Days Post-Op      Medications:  Continuous Infusions:  Scheduled Meds:   acetaminophen  650 mg Oral 4 times per day    albuterol-ipratropium  3 mL Nebulization Q6H    apixaban  5 mg Oral BID    atorvastatin  10 mg Oral Daily    docusate sodium  100 mg Oral BID    furosemide  40 mg Intravenous Once    gabapentin  300 mg Oral TID    ketorolac  15 mg Intravenous Q6H    lidocaine (PF) 10 mg/ml (1%)  1 mL Intradermal Once    metoprolol succinate  50 mg Oral Daily    mupirocin  1 g Nasal BID    pantoprazole  40 mg Oral Daily    senna  8.6 mg Oral Daily     PRN Meds:dextrose 50%, dextrose 50%, glucagon (human recombinant), glucose, glucose, insulin aspart U-100, metoprolol, pneumoc 13-henrique conj-dip cr(PF)     Objective:     Vital Signs (Most Recent):  Temp: 98.6 °F (37 °C) (10/17/19 0301)  Pulse: 103 (10/17/19 0622)  Resp: (!) 23 (10/17/19 0622)  BP: (!) 137/59 (10/17/19 0602)  SpO2: 99 % (10/17/19 0622) Vital Signs (24h Range):  Temp:  [98.6 °F (37 °C)-101.1 °F (38.4 °C)] 98.6 °F (37 °C)  Pulse:  [] 103  Resp:  [13-42] 23  SpO2:  [66 %-100 %] 99 %  BP: (105-145)/(52-81) 137/59       Intake/Output Summary (Last 24 hours) at 10/17/2019 0707  Last data filed at 10/17/2019 0601  Gross per 24 hour   Intake 240 ml   Output 920 ml   Net -680 ml       Physical Exam  abd- soft, appropriately tender. Incisions c/d/i   Significant Labs:  BMP:   Recent Labs   Lab 10/16/19  0515 10/16/19  2341   *  155* 151*     138 137   K 4.7  4.7 4.0     107 108   CO2 23  23 23   BUN 20  20 22   CREATININE 0.8  0.8 1.0   CALCIUM 8.8  8.8 8.9   MG 1.8  --      CBC:   Recent Labs   Lab 10/16/19  2341   WBC 9.14   RBC 3.29*   HGB 8.7*   HCT 30.0*   PLT 91*   MCV 91   MCH 26.4*   MCHC  29.0*     Lactic Acid:   Recent Labs   Lab 10/16/19  2341   LACTATE 1.2       Significant Diagnostics:  I have reviewed all pertinent imaging results/findings within the past 24 hours.    Assessment/Plan:     Active Diagnoses:    Diagnosis Date Noted POA    PRINCIPAL PROBLEM:  Colonic mass [K63.89] 10/15/2019 Yes      Problems Resolved During this Admission:     73 YOF POD 2 s/p lap right colectomy. Transferred to ICU for AMS. HD stable. Labs wnl. CT head negative.     CLD  Narcotics held  HLIV  Hernandez for urinary retention   PT/OT  IS  Lovenox/PPI  Resume eliquis   Appreciate hospitalist help in management  Consult neurology for AMS     Klaus Guzman MD  General Surgery  [facility]

## 2019-10-17 NOTE — NURSING
Rapid response was called regarding patient being difficult to arouse. Patient responded to painful stimuli but falls back to sleep. Blood glucose 162. VS WNL. 4L nasal cannula with O2 Sats 98%. Blood gas WNL. Narcan administered sheridan. Karlene at bedside to assess patient.  states patient did not need to be transferred to ICU.  states the primary nurse needs to contact and update the surgeon who is primary on the case. Med-Surg charge nurse states she spoke with surgery and they stated to transfer patient to ICU for a stat head CT Scan. Patient transferred to ICU. Resident arrives about 30 minutes after patient transferred. STAT CT and lab work ordered. No critical labs were resulted and CTof Head came back negative.

## 2019-10-17 NOTE — CONSULTS
[facility]  Neurology  Consult Note    Patient Name: Ivan Cruz  MRN: [id number]  Admission Date: 10/15/2019  Hospital Length of Stay: 2 days  Code Status: Prior   Attending Provider: Caleb Ellis MD   Consulting Provider: Nik Crouch MD  Primary Care Physician: Ezio Vivar MD  Principal Problem:Colonic mass    Consults  Subjective:     Chief Complaint/Reason for consult: AMS    HPI: 72 y/o female with medical Hx as listed below was admitted to this facility of right colectomy. Several days after surgery pt has been found to be lethargic or sleeping most of the time. Last night a Rapid Response was called on her. Pt has periods of alertness where she is able to communicate and follow come commands. No seizures, unilateral weakness reported.    Past Medical History:   Diagnosis Date    Arthritis     Atrial fibrillation     Chronic heart failure     Coronary artery disease     Diabetes mellitus     Hypercholesteremia     Hypertension     Pancytopenia        Past Surgical History:   Procedure Laterality Date    APPENDECTOMY      BACK SURGERY      CHOLECYSTECTOMY      CORONARY ARTERY BYPASS GRAFT      LAPAROSCOPIC RIGHT COLON RESECTION N/A 10/15/2019    Procedure: COLECTOMY, RIGHT, LAPAROSCOPIC;  Surgeon: Caleb Ellis MD;  Location: [facility];  Service: General;  Laterality: N/A;  RN PREOP 10/8/2019  DRAW T/S ON AM OF SURG    TUBAL LIGATION         Review of patient's allergies indicates:   Allergen Reactions    Biaxin [clarithromycin]      Doesn't know reaction       Current Neurological Medications:     No current facility-administered medications on file prior to encounter.      Current Outpatient Medications on File Prior to Encounter   Medication Sig    atorvastatin (LIPITOR) 10 MG tablet 10 mg once daily.     glipiZIDE (GLUCOTROL) 10 MG TR24 Take 10 mg by mouth daily with breakfast.       Family History     Problem Relation (Age of Onset)    Breast cancer  Sister (67)    Colon cancer Brother (50)        Tobacco Use    Smoking status: Never Smoker    Smokeless tobacco: Never Used   Substance and Sexual Activity    Alcohol use: No    Drug use: No    Sexual activity: Not on file     Review of Systems   Unable to obtain due to lethargy    Objective:     Vital Signs (Most Recent):  Temp: 98.6 °F (37 °C) (10/17/19 0301)  Pulse: 98 (10/17/19 1022)  Resp: (!) 25 (10/17/19 0930)  BP: 136/60 (10/17/19 1022)  SpO2: 100 % (10/17/19 0930) Vital Signs (24h Range):  Temp:  [98.6 °F (37 °C)-101.1 °F (38.4 °C)] 98.6 °F (37 °C)  Pulse:  [] 98  Resp:  [13-42] 25  SpO2:  [66 %-100 %] 100 %  BP: (105-152)/(52-81) 136/60     Weight: 78 kg (171 lb 15.3 oz)  Body mass index is 25.39 kg/m².    Physical Exam   HENT:   Head: Normocephalic.   Eyes: Right eye exhibits no discharge. Left eye exhibits no discharge.   Neck: Normal range of motion.   Cardiovascular: Normal rate.   Pulmonary/Chest: No respiratory distress.   Abdominal: Soft.   Musculoskeletal: She exhibits no edema.       NEUROLOGICAL EXAMINATION:     MENTAL STATUS        Arouse after several attempts with noxious stimuli  Unintelligible speech       CRANIAL NERVES     CN III, IV, VI   Right pupil: Size: 2 mm. Shape: regular.   Left pupil: Size: 2 mm. Shape: regular.   Nystagmus: none   Ophthalmoparesis: none  Conjugate gaze: present    CN VII   Right facial weakness: none  Left facial weakness: none    MOTOR EXAM        Partial AROM on noxious stimuli     SENSORY EXAM        Grimaces and withdraws to noxious stimuli on four extremities     GAIT AND COORDINATION     Tremor   Resting tremor: absent      Significant Labs:   CBC:   Recent Labs   Lab 10/16/19  0515 10/16/19  2341   WBC 10.87 9.14   HGB 9.3* 8.7*   HCT 31.3* 30.0*   * 91*     CMP:   Recent Labs   Lab 10/16/19  0515 10/16/19  2341   *  155* 151*     138 137   K 4.7  4.7 4.0     107 108   CO2 23  23 23   BUN 20  20 22   CREATININE  0.8  0.8 1.0   CALCIUM 8.8  8.8 8.9   MG 1.8  --    PROT 6.7  --    ALBUMIN 3.3*  --    BILITOT 1.0  --    ALKPHOS 222*  --    AST 29  --    ALT 21  --    ANIONGAP 8  8 6*   EGFRNONAA >60  >60 56*       Significant Imaging:  Head CT    FINDINGS:  Ventricles are normal size.  There is mild periventricular lucency.  There is no acute intracranial hemorrhage, territorial infarct or mass effect, or midline shift. The visualized paranasal sinuses and mastoid air cells are clear.      Impression       No acute intracranial abnormality detected.  Mild chronic small vessel ischemic change.      Electronically signed by: Homa Momin  Date: 10/17/2019  Time: 00:03         Assessment and Plan:     72 y/o female consulted for AMS    1. AMS: although uncertain this could as effect from gabapentin. Pt is also mildly hypercapnic. On exam, although limited, there are no focal or concerning findings. No acute abnormalities on head CT.   -Will D/C gabapentin.   -Avoid opiates, benzo's, potentially sedating meds.    Active Diagnoses:    Diagnosis Date Noted POA    PRINCIPAL PROBLEM:  Colonic mass [K63.89] 10/15/2019 Yes      Problems Resolved During this Admission:       VTE Risk Mitigation (From admission, onward)         Ordered     apixaban tablet 5 mg  2 times daily      10/16/19 7983                Thank you for your consult. I will follow-up with patient. Please contact us if you have any additional questions.    Nik Crouch MD  Neurology  [facility]

## 2019-10-17 NOTE — PLAN OF CARE
Patient transferred to ICU from Med-Surg due patient being lethargic. Surgeon aware of patient being lethargic. STAT CT of head was negative. VS WNL. Afebrile. Af.Fib on cardiac monitor. Saline locked. Hernandez catheter intact and draining to gravity. SCD's in place. Mepilex placed on sacral area. Accu-checks AC/HS. 4L nasal cannula. Skin tears and bruising to upper extremities. Plans to transfer patient back to Med-Surg unit this AM.

## 2019-10-17 NOTE — NURSING
Just spoke with patient's sister-in-law regarding patient's transfer to ICU. Sister-in-law states floor nurse told her that patient was critically ill and something may happen to her tonight. Expressed to family that patient was stable and test that were ran were negative. Family states understanding.

## 2019-10-17 NOTE — EICU
New admit    74 y/o F history of afib, underwent ride sided laparoscopic colectomy for colonic polyp.  Postoperatively patient was noted to be difficult to arouse with episodes of desaturation, obstructive in nature that improved with jaw thrust.  This was thought to be be secondary to sedation from narcotics for which she was given naloxone and medications adjusted. Stat head CT scan was negative.     Camera assessment:  Patient seen asleep, audible snoring with hypopneic episodes, no desaturation on minimal O2 supplementation    Telemetry:  /72 HR 97  O2 98%    Data:  ABG 7.27/53/131  WBC 9.14, H/H 8.7/30, platelets 91  Na 137, K 4, creatinine 1  AST 29, ALT 21  Lactate 1.2  CXR increased interstitial markings L>R    · Lethargy could still be anesthesia or narcotic effect. Would avoid further sedating medications especially in light of possible obstructive sleep apnea.  Will need sleep study if she has not had one done yet..

## 2019-10-17 NOTE — PLAN OF CARE
Pt continuous o2 sat dropped to 66%. Pt was pale and apneic. Not responsive to any stimuli. Rapid response called. Resp and charge nurse at bedside. Waiting for surgeon to call back. Hospitalist at bedside ordered 2 doses of narcan, which was given and transfer to icu for observation.

## 2019-10-17 NOTE — CARE UPDATE
Arrived to pts. Room to give scheduled MA TX.  Pt. Very hard to arouse.  RR 16, HR 92, SpO2 96% on 4LPM NC.  Notified pts. RN and Charge nurse that pt. Is hard to arouse.  Pts. RN states that she received in report that pt. Is a sound sleeper.  Asked charge nurse to come to pts bedside and assess pt.

## 2019-10-17 NOTE — PLAN OF CARE
Problem: Adult Inpatient Plan of Care  Goal: Patient-Specific Goal (Individualization)  Outcome: Ongoing, Progressing   Respiratory: Patient very lethargic, receiving neb treatments as ordered. On oxygen at 4 llpm. No apparent signs of respiratory distress at this time.

## 2019-10-18 LAB
ALBUMIN SERPL BCP-MCNC: 2.9 G/DL (ref 3.5–5.2)
ALLENS TEST: ABNORMAL
ALP SERPL-CCNC: 146 U/L (ref 55–135)
ALT SERPL W/O P-5'-P-CCNC: 7 U/L (ref 10–44)
ANION GAP SERPL CALC-SCNC: 7 MMOL/L (ref 8–16)
AST SERPL-CCNC: 11 U/L (ref 10–40)
BASOPHILS # BLD AUTO: 0.01 K/UL (ref 0–0.2)
BASOPHILS NFR BLD: 0.2 % (ref 0–1.9)
BILIRUB SERPL-MCNC: 0.5 MG/DL (ref 0.1–1)
BLD PROD TYP BPU: NORMAL
BLD PROD TYP BPU: NORMAL
BLOOD UNIT EXPIRATION DATE: NORMAL
BLOOD UNIT EXPIRATION DATE: NORMAL
BLOOD UNIT TYPE CODE: 9500
BLOOD UNIT TYPE CODE: 9500
BLOOD UNIT TYPE: NORMAL
BLOOD UNIT TYPE: NORMAL
BUN SERPL-MCNC: 27 MG/DL (ref 8–23)
CALCIUM SERPL-MCNC: 8.1 MG/DL (ref 8.7–10.5)
CHLORIDE SERPL-SCNC: 108 MMOL/L (ref 95–110)
CO2 SERPL-SCNC: 21 MMOL/L (ref 23–29)
CODING SYSTEM: NORMAL
CODING SYSTEM: NORMAL
CREAT SERPL-MCNC: 1.1 MG/DL (ref 0.5–1.4)
DELSYS: ABNORMAL
DIFFERENTIAL METHOD: ABNORMAL
DISPENSE STATUS: NORMAL
DISPENSE STATUS: NORMAL
EOSINOPHIL # BLD AUTO: 0.2 K/UL (ref 0–0.5)
EOSINOPHIL NFR BLD: 2.9 % (ref 0–8)
ERYTHROCYTE [DISTWIDTH] IN BLOOD BY AUTOMATED COUNT: 16.3 % (ref 11.5–14.5)
EST. GFR  (AFRICAN AMERICAN): 58 ML/MIN/1.73 M^2
EST. GFR  (NON AFRICAN AMERICAN): 50 ML/MIN/1.73 M^2
FLOW: 3
GLUCOSE SERPL-MCNC: 172 MG/DL (ref 70–110)
HCO3 UR-SCNC: 22.2 MMOL/L (ref 24–28)
HCT VFR BLD AUTO: 24.4 % (ref 37–48.5)
HGB BLD-MCNC: 7.3 G/DL (ref 12–16)
IMM GRANULOCYTES # BLD AUTO: 0.01 K/UL (ref 0–0.04)
IMM GRANULOCYTES NFR BLD AUTO: 0.2 % (ref 0–0.5)
LYMPHOCYTES # BLD AUTO: 0.7 K/UL (ref 1–4.8)
LYMPHOCYTES NFR BLD: 13.4 % (ref 18–48)
MCH RBC QN AUTO: 26.8 PG (ref 27–31)
MCHC RBC AUTO-ENTMCNC: 29.9 G/DL (ref 32–36)
MCV RBC AUTO: 90 FL (ref 82–98)
MODE: ABNORMAL
MONOCYTES # BLD AUTO: 0.4 K/UL (ref 0.3–1)
MONOCYTES NFR BLD: 7.1 % (ref 4–15)
NEUTROPHILS # BLD AUTO: 3.9 K/UL (ref 1.8–7.7)
NEUTROPHILS NFR BLD: 76.2 % (ref 38–73)
NRBC BLD-RTO: 0 /100 WBC
PCO2 BLDA: 50.1 MMHG (ref 35–45)
PH SMN: 7.25 [PH] (ref 7.35–7.45)
PLATELET # BLD AUTO: 75 K/UL (ref 150–350)
PMV BLD AUTO: 10.5 FL (ref 9.2–12.9)
PO2 BLDA: 163 MMHG (ref 80–100)
POC BE: -5 MMOL/L
POC SATURATED O2: 99 % (ref 95–100)
POC TCO2: 24 MMOL/L (ref 23–27)
POCT GLUCOSE: 175 MG/DL (ref 70–110)
POCT GLUCOSE: 177 MG/DL (ref 70–110)
POCT GLUCOSE: 178 MG/DL (ref 70–110)
POCT GLUCOSE: 192 MG/DL (ref 70–110)
POTASSIUM SERPL-SCNC: 3.8 MMOL/L (ref 3.5–5.1)
PROT SERPL-MCNC: 5.6 G/DL (ref 6–8.4)
RBC # BLD AUTO: 2.72 M/UL (ref 4–5.4)
SAMPLE: ABNORMAL
SITE: ABNORMAL
SODIUM SERPL-SCNC: 136 MMOL/L (ref 136–145)
SP02: 98
TRANS ERYTHROCYTES VOL PATIENT: NORMAL ML
TRANS ERYTHROCYTES VOL PATIENT: NORMAL ML
WBC # BLD AUTO: 5.09 K/UL (ref 3.9–12.7)

## 2019-10-18 PROCEDURE — 63600175 PHARM REV CODE 636 W HCPCS: Performed by: STUDENT IN AN ORGANIZED HEALTH CARE EDUCATION/TRAINING PROGRAM

## 2019-10-18 PROCEDURE — 94640 AIRWAY INHALATION TREATMENT: CPT

## 2019-10-18 PROCEDURE — 99900035 HC TECH TIME PER 15 MIN (STAT)

## 2019-10-18 PROCEDURE — 27000221 HC OXYGEN, UP TO 24 HOURS

## 2019-10-18 PROCEDURE — 25000003 PHARM REV CODE 250: Performed by: SURGERY

## 2019-10-18 PROCEDURE — 94761 N-INVAS EAR/PLS OXIMETRY MLT: CPT

## 2019-10-18 PROCEDURE — 94660 CPAP INITIATION&MGMT: CPT

## 2019-10-18 PROCEDURE — 85025 COMPLETE CBC W/AUTO DIFF WBC: CPT

## 2019-10-18 PROCEDURE — 36430 TRANSFUSION BLD/BLD COMPNT: CPT

## 2019-10-18 PROCEDURE — 80053 COMPREHEN METABOLIC PANEL: CPT

## 2019-10-18 PROCEDURE — P9021 RED BLOOD CELLS UNIT: HCPCS

## 2019-10-18 PROCEDURE — 99232 PR SUBSEQUENT HOSPITAL CARE,LEVL II: ICD-10-PCS | Mod: ,,, | Performed by: PSYCHIATRY & NEUROLOGY

## 2019-10-18 PROCEDURE — 20000000 HC ICU ROOM

## 2019-10-18 PROCEDURE — 97802 MEDICAL NUTRITION INDIV IN: CPT

## 2019-10-18 PROCEDURE — 82803 BLOOD GASES ANY COMBINATION: CPT

## 2019-10-18 PROCEDURE — S5010 5% DEXTROSE AND 0.45% SALINE: HCPCS | Performed by: SURGERY

## 2019-10-18 PROCEDURE — 27000190 HC CPAP FULL FACE MASK W/VALVE

## 2019-10-18 PROCEDURE — 36415 COLL VENOUS BLD VENIPUNCTURE: CPT

## 2019-10-18 PROCEDURE — 95816 PR EEG,W/AWAKE & DROWSY RECORD: ICD-10-PCS | Mod: 26,,, | Performed by: PSYCHIATRY & NEUROLOGY

## 2019-10-18 PROCEDURE — 25000242 PHARM REV CODE 250 ALT 637 W/ HCPCS: Performed by: STUDENT IN AN ORGANIZED HEALTH CARE EDUCATION/TRAINING PROGRAM

## 2019-10-18 PROCEDURE — 25000003 PHARM REV CODE 250: Performed by: STUDENT IN AN ORGANIZED HEALTH CARE EDUCATION/TRAINING PROGRAM

## 2019-10-18 PROCEDURE — 36600 WITHDRAWAL OF ARTERIAL BLOOD: CPT

## 2019-10-18 PROCEDURE — 99232 SBSQ HOSP IP/OBS MODERATE 35: CPT | Mod: ,,, | Performed by: PSYCHIATRY & NEUROLOGY

## 2019-10-18 PROCEDURE — 95816 EEG AWAKE AND DROWSY: CPT | Mod: 26,,, | Performed by: PSYCHIATRY & NEUROLOGY

## 2019-10-18 PROCEDURE — 95816 EEG AWAKE AND DROWSY: CPT

## 2019-10-18 RX ORDER — HYDROCODONE BITARTRATE AND ACETAMINOPHEN 500; 5 MG/1; MG/1
TABLET ORAL
Status: DISCONTINUED | OUTPATIENT
Start: 2019-10-18 | End: 2019-10-21

## 2019-10-18 RX ADMIN — PANTOPRAZOLE SODIUM 40 MG: 40 TABLET, DELAYED RELEASE ORAL at 09:10

## 2019-10-18 RX ADMIN — APIXABAN 5 MG: 5 TABLET, FILM COATED ORAL at 09:10

## 2019-10-18 RX ADMIN — DOCUSATE SODIUM 100 MG: 100 CAPSULE, LIQUID FILLED ORAL at 09:10

## 2019-10-18 RX ADMIN — KETOROLAC TROMETHAMINE 15 MG: 30 INJECTION, SOLUTION INTRAMUSCULAR at 07:10

## 2019-10-18 RX ADMIN — METOPROLOL SUCCINATE 50 MG: 50 TABLET, EXTENDED RELEASE ORAL at 09:10

## 2019-10-18 RX ADMIN — KETOROLAC TROMETHAMINE 15 MG: 30 INJECTION, SOLUTION INTRAMUSCULAR at 12:10

## 2019-10-18 RX ADMIN — IPRATROPIUM BROMIDE AND ALBUTEROL SULFATE 3 ML: .5; 3 SOLUTION RESPIRATORY (INHALATION) at 09:10

## 2019-10-18 RX ADMIN — ACETAMINOPHEN 650 MG: 325 TABLET, FILM COATED ORAL at 07:10

## 2019-10-18 RX ADMIN — ATORVASTATIN CALCIUM 10 MG: 10 TABLET, FILM COATED ORAL at 09:10

## 2019-10-18 RX ADMIN — SENNOSIDES 8.6 MG: 8.6 TABLET, FILM COATED ORAL at 09:10

## 2019-10-18 RX ADMIN — ACETAMINOPHEN 650 MG: 325 TABLET, FILM COATED ORAL at 06:10

## 2019-10-18 RX ADMIN — ACETAMINOPHEN 650 MG: 325 TABLET, FILM COATED ORAL at 11:10

## 2019-10-18 RX ADMIN — IPRATROPIUM BROMIDE AND ALBUTEROL SULFATE 3 ML: .5; 3 SOLUTION RESPIRATORY (INHALATION) at 01:10

## 2019-10-18 RX ADMIN — MUPIROCIN 1 G: 20 OINTMENT TOPICAL at 09:10

## 2019-10-18 RX ADMIN — IPRATROPIUM BROMIDE AND ALBUTEROL SULFATE 3 ML: .5; 3 SOLUTION RESPIRATORY (INHALATION) at 12:10

## 2019-10-18 RX ADMIN — KETOROLAC TROMETHAMINE 15 MG: 30 INJECTION, SOLUTION INTRAMUSCULAR at 11:10

## 2019-10-18 RX ADMIN — ACETAMINOPHEN 650 MG: 325 TABLET, FILM COATED ORAL at 01:10

## 2019-10-18 RX ADMIN — KETOROLAC TROMETHAMINE 15 MG: 30 INJECTION, SOLUTION INTRAMUSCULAR at 06:10

## 2019-10-18 RX ADMIN — DEXTROSE AND SODIUM CHLORIDE: 5; .45 INJECTION, SOLUTION INTRAVENOUS at 01:10

## 2019-10-18 RX ADMIN — DEXTROSE AND SODIUM CHLORIDE: 5; .45 INJECTION, SOLUTION INTRAVENOUS at 06:10

## 2019-10-18 RX ADMIN — IPRATROPIUM BROMIDE AND ALBUTEROL SULFATE 3 ML: .5; 3 SOLUTION RESPIRATORY (INHALATION) at 07:10

## 2019-10-18 NOTE — PLAN OF CARE
Patient remains lethargic and hard to arouse but does awaken at times. When awake she knows her family and will converse with the nurse and eat and drink. When sleeping its hard to wake her up. Patient also had low urine output and was started on iv fluids after being given albumin and a bolus. No injuries or falls noted this shift. No skin breakdown this shift. Plan of care reviewed with multiple family members at bedside.

## 2019-10-18 NOTE — PLAN OF CARE
Patient more alert today. Patient had longer periods of awakefulness and ate much more of her clear liquid trays. Vital signs have been stable but a little on low side. H and H low this am and pressure low and urine output low and surgery decided to give two units of PRBCs . Patient completed EGG today . No skin or injuries this shift. No skin breakdown this shift. Plan of care reviewed with patient and family this shift.

## 2019-10-18 NOTE — NURSING
MD Madera notified of pt periods of apnea when asleep and continued difficulty waking pt up. Ordered ABG and switch to Venti mask. Also reported swollen R abd. No new orders. Will continue to monitor.

## 2019-10-18 NOTE — PROGRESS NOTES
[facility]  General Surgery  Progress Note    Subjective:     Interval History:  Reportedly more alert this morning however was confused this afternoon again.   Having bowel function.   Post-Op Info:  Procedure(s) (LRB):  COLECTOMY, RIGHT, LAPAROSCOPIC (N/A)   3 Days Post-Op      Medications:  Continuous Infusions:   dextrose 5 % and 0.45 % NaCl 125 mL/hr at 10/18/19 1200     Scheduled Meds:   acetaminophen  650 mg Oral 4 times per day    albuterol-ipratropium  3 mL Nebulization Q6H    apixaban  5 mg Oral BID    atorvastatin  10 mg Oral Daily    docusate sodium  100 mg Oral BID    ketorolac  15 mg Intravenous Q6H    lidocaine (PF) 10 mg/ml (1%)  1 mL Intradermal Once    metoprolol succinate  50 mg Oral Daily    mupirocin  1 g Nasal BID    pantoprazole  40 mg Oral Daily    senna  8.6 mg Oral Daily     PRN Meds:sodium chloride, dextrose 50%, dextrose 50%, glucagon (human recombinant), glucose, glucose, influenza, insulin aspart U-100, metoprolol, pneumoc 13-henrique conj-dip cr(PF)     Objective:     Vital Signs (Most Recent):  Temp: 98 °F (36.7 °C) (10/18/19 1230)  Pulse: 79 (10/18/19 1317)  Resp: 15 (10/18/19 1317)  BP: (!) 94/49 (10/18/19 1230)  SpO2: 98 % (10/18/19 1317) Vital Signs (24h Range):  Temp:  [98 °F (36.7 °C)-98.5 °F (36.9 °C)] 98 °F (36.7 °C)  Pulse:  [75-98] 79  Resp:  [12-33] 15  SpO2:  [94 %-100 %] 98 %  BP: ()/(35-77) 94/49       Intake/Output Summary (Last 24 hours) at 10/18/2019 1320  Last data filed at 10/18/2019 1200  Gross per 24 hour   Intake 3113.33 ml   Output 850 ml   Net 2263.33 ml       Physical Exam  abd- soft, appropriately tender. Incisions c/d/i   Significant Labs:  CBC:   Recent Labs   Lab 10/18/19  1138   WBC 5.09   RBC 2.72*   HGB 7.3*   HCT 24.4*   PLT 75*   MCV 90   MCH 26.8*   MCHC 29.9*     CMP:   Recent Labs   Lab 10/18/19  1138   *   CALCIUM 8.1*   ALBUMIN 2.9*   PROT 5.6*      K 3.8   CO2 21*      BUN 27*   CREATININE 1.1    ALKPHOS 146*   ALT 7*   AST 11   BILITOT 0.5       Significant Diagnostics:  None    Assessment/Plan:     Active Diagnoses:    Diagnosis Date Noted POA    PRINCIPAL PROBLEM:  Colonic mass [K63.89] 10/15/2019 Yes      Problems Resolved During this Admission:     73 YOF POD 3 s/p lap right colectomy. Transferred to ICU for AMS. HD stable. H/h 7.3/24 CT head negative. Neurology to order EEG.      CLD  IVMF  Transfuse 2 units prbc due to acute blood loss anemia  Hernandez for urinary retention and strict I/o   PT/OT  IS/RT       Klaus Guzman MD  General Surgery  [facility]

## 2019-10-18 NOTE — PLAN OF CARE
Pt remains stable on 3lnc. Bipap on stby @ bedside for nightly use. Will continue scheduled respiratory treatments as ordered. Pt is unable to do incentive spirometer @ this time. Abg ordered by Dr. Crouch and results reported to him .

## 2019-10-18 NOTE — CARE UPDATE
[facility]  ICU Multidisciplinary Bedside Rounds   SUMMARY     Date: 10/18/2019    Prehospitalization: Nursing Home ([facility])  Admit Date / LOS : 10/15/2019/ 3 days    Diagnosis: Colonic mass    Consults:        Active: Gen Surg, Neuro, OT and PT       Needed: N/A     Code Status: Prior   Advanced Directive: Not Received    LDA: BIPAP, Hernandez and PIV       Central Lines/Site/Justification:Patient Does Not Have Central Line       Urinary Cath/Order/Justification:Urinary Retention and Critically Ill in ICU    Vasopressors/Infusions:    dextrose 5 % and 0.45 % NaCl 125 mL/hr at 10/18/19 0300          GOALS: Volume/ Hemodynamic: N/A                     RASS: N/A    CAM ICU: Positive  Pain Management: IV (toradol) and PO (when tolerating)       Pain Controlled: yes     Rhythm: A-Fib (rate controlled)    Respiratory Device: Bipap    Oxygen Concentration (%):  [40] 40          VTE Prophylaxis: Pharm and Mechanical  Mobility: Bedrest and A: Assist  Stress Ulcer Prophylaxis: Yes    Dietary: PO  Tolerance: no (has not been alert enough to pass nursing bedside swallow assessment)  /  Advancement: no     Isolation: No active isolations    Restraints: No    Significant Dates:  Post Op Date: 10/15/19 colectomy of cecum  Rescue Date: 10/16/19  Imaging/ Diagnostics: CT Head 10/16/19, CXR 10/16/19,     Noteworthy Labs:  Repeat ABG (BIpap ordered), no other labs ordered this AM    CBC/Anemia Labs: Coags:    Recent Labs   Lab 10/16/19  0515 10/16/19  2341   WBC 10.87 9.14   HGB 9.3* 8.7*   HCT 31.3* 30.0*   * 91*   MCV 89 91   RDW 16.7* 16.7*    No results for input(s): PT, INR, APTT in the last 168 hours.     Chemistries:   Recent Labs   Lab 10/16/19  0515 10/16/19  2341     138 137   K 4.7  4.7 4.0     107 108   CO2 23  23 23   BUN 20  20 22   CREATININE 0.8  0.8 1.0   CALCIUM 8.8  8.8 8.9   PROT 6.7  --    BILITOT 1.0  --    ALKPHOS 222*  --    ALT 21  --    AST 29  --    MG 1.8  --          Cardiac Enzymes: Ejection Fractions:    No results for input(s): CPK, CPKMB, MB, TROPONINI in the last 72 hours. No results found for: EF     POCT Glucose: HbA1c:    Recent Labs   Lab 10/16/19  2038 10/16/19  2212 10/17/19  0854 10/17/19  1226 10/17/19  1752 10/17/19  2147   POCTGLUCOSE 133* 162* 147* 151* 214* 226*    No results found for: HGBA1C     Needs from Care Team: AM labs?, surgeon to look at pt R abd and see if changed from initial surgery?, repeat ABG after pt wore BIPAP throughout night, address decreasing UO.      ICU LOS 1d 5h  Level of Care: Critical Care (continued AMS, lethargy, Bipap)

## 2019-10-18 NOTE — PROGRESS NOTES
[facility]  Neurology  Progress Note    Patient Name: Ivan Cruz  MRN: [id number]  Admission Date: 10/15/2019  Hospital Length of Stay: 3 days  Code Status: Prior   Attending Provider: Caleb Ellis MD  Primary Care Physician: Ezio Vivar MD   Principal Problem:Colonic mass    Subjective:     Interval History: 72 y/o female with medical Hx as listed below was admitted to this facility of right colectomy. Several days after surgery pt has been found to be lethargic or sleeping most of the time. Last night a Rapid Response was called on her. Pt has periods of alertness where she is able to communicate and follow come commands. No seizures, unilateral weakness reported.    -10/18/19 Pt was alert this mornig, talking to nurse and eating breakfast. Now she is barely arousable.    Current Neurological Medications:     Current Facility-Administered Medications   Medication Dose Route Frequency Provider Last Rate Last Dose    acetaminophen tablet 650 mg  650 mg Oral 4 times per day Klaus Guzman MD   650 mg at 10/18/19 0609    albuterol-ipratropium 2.5 mg-0.5 mg/3 mL nebulizer solution 3 mL  3 mL Nebulization Q6H Klaus Guzman MD   3 mL at 10/18/19 0934    apixaban tablet 5 mg  5 mg Oral BID Klaus Guzman MD   5 mg at 10/18/19 0911    atorvastatin tablet 10 mg  10 mg Oral Daily Klaus Guzman MD   10 mg at 10/18/19 0911    dextrose 5 % and 0.45 % NaCl infusion   Intravenous Continuous Caleb Ellis  mL/hr at 10/18/19 1200      dextrose 50% injection 12.5 g  12.5 g Intravenous PRN Venu Madera MD        dextrose 50% injection 25 g  25 g Intravenous PRN Venu Madera MD        docusate sodium capsule 100 mg  100 mg Oral BID Klaus Guzman MD   100 mg at 10/18/19 0913    glucagon (human recombinant) injection 1 mg  1 mg Intramuscular PRN Venu Madera MD        glucose chewable tablet 16 g  16 g Oral PRN Venu Madera MD        glucose chewable tablet  24 g  24 g Oral PRN Venu Madera MD        influenza (HIGH-DOSE PF) vaccine 0.5 mL  0.5 mL Intramuscular vaccine x 1 dose Caleb Ellis MD        insulin aspart U-100 pen 0-5 Units  0-5 Units Subcutaneous QID (AC + HS) PRN Venu Madera MD   1 Units at 10/17/19 2147    ketorolac injection 15 mg  15 mg Intravenous Q6H Klaus Guzman MD   15 mg at 10/18/19 0609    lidocaine (PF) 10 mg/ml (1%) injection 10 mg  1 mL Intradermal Once Caleb Ellis MD        metoprolol injection 10 mg  10 mg Intravenous Q6H PRN Venu Madera MD        metoprolol succinate (TOPROL-XL) 24 hr tablet 50 mg  50 mg Oral Daily Caleb Ellis MD   50 mg at 10/18/19 0917    mupirocin 2 % ointment 1 g  1 g Nasal BID Caleb Ellis MD   1 g at 10/18/19 0914    pantoprazole EC tablet 40 mg  40 mg Oral Daily Klaus Guzman MD   40 mg at 10/18/19 0916    pneumoc 13-henrique conj-dip cr(PF) (PREVNAR 13 (PF)) 0.5 mL  0.5 mL Intramuscular vaccine x 1 dose Caleb Ellis MD        senna tablet 8.6 mg  8.6 mg Oral Daily Klaus Guzman MD   8.6 mg at 10/18/19 0918       Review of Systems   Unable to obtain due to AMS    Objective:     Vital Signs (Most Recent):  Temp: 98 °F (36.7 °C) (10/18/19 1230)  Pulse: 81 (10/18/19 1230)  Resp: 17 (10/18/19 1230)  BP: (!) 94/49 (10/18/19 1230)  SpO2: 99 % (10/18/19 1230) Vital Signs (24h Range):  Temp:  [98 °F (36.7 °C)-98.5 °F (36.9 °C)] 98 °F (36.7 °C)  Pulse:  [75-98] 81  Resp:  [12-33] 17  SpO2:  [94 %-100 %] 99 %  BP: ()/(35-77) 94/49     Weight: 78 kg (171 lb 15.3 oz)  Body mass index is 25.39 kg/m².    Physical Exam  HENT:   Head: Normocephalic.   Eyes: Right eye exhibits no discharge. Left eye exhibits no discharge.   Neck: Normal range of motion.   Cardiovascular: Normal rate.   Pulmonary/Chest: No respiratory distress.   Abdominal: Soft.   Musculoskeletal: She exhibits no edema.         NEUROLOGICAL EXAMINATION:      MENTAL STATUS        Arouse after several attempts  with noxious stimuli  Unintelligible speech        CRANIAL NERVES      CN III, IV, VI   Right pupil: Size: 4 mm. Shape: regular.   Left pupil: Size: 4 mm. Shape: regular.   Nystagmus: none   Ophthalmoparesis: none  Conjugate gaze: present     CN VII   Right facial weakness: none  Left facial weakness: none     MOTOR EXAM        Partial AROM on noxious stimuli      SENSORY EXAM        Grimaces and withdraws to noxious stimuli on four extremities      GAIT AND COORDINATION      Tremor   Resting tremor: absent        Significant Labs:   CBC:   Recent Labs   Lab 10/16/19  2341 10/18/19  1138   WBC 9.14 5.09   HGB 8.7* 7.3*   HCT 30.0* 24.4*   PLT 91* 75*     CMP:   Recent Labs   Lab 10/16/19  2341 10/18/19  1138   * 172*    136   K 4.0 3.8    108   CO2 23 21*   BUN 22 27*   CREATININE 1.0 1.1   CALCIUM 8.9 8.1*   PROT  --  5.6*   ALBUMIN  --  2.9*   BILITOT  --  0.5   ALKPHOS  --  146*   AST  --  11   ALT  --  7*   ANIONGAP 6* 7*   EGFRNONAA 56* 50*         Assessment and Plan:     74 y/o female consulted for AMS     1. AMS: gabapentin D/C yesterday. Pt continues to have period of normal level of awareness the long periods of unreponsiveness and rigidity on four extremities.           -Repeat ABG's.           -EEG ordered.       Active Diagnoses:    Diagnosis Date Noted POA    PRINCIPAL PROBLEM:  Colonic mass [K63.89] 10/15/2019 Yes      Problems Resolved During this Admission:       VTE Risk Mitigation (From admission, onward)         Ordered     apixaban tablet 5 mg  2 times daily      10/16/19 3800                Nik Crouch MD  Neurology  [facility] [place]

## 2019-10-18 NOTE — PLAN OF CARE
Patient remains in ICU overnight. AAOx1-2, needs frequent reorientation to place, time, and situation. Remains lethargic and difficult to arouse with periods of alertness. VSS, afebrile, afib with controlled rate on cardiac monitor. Placed on BiPaP per MD Madera. FiO2 40%, 10/5. Pt tolerating well. R abdominal incision and dressings CDI. No symptoms of bleeding. R side of abdomen more swollen than left but remains soft and unchanged throughout shift and since reported to MD with no new orders. Bowel sounds active in all four quadrants. BUE elevated to minimize dependent edema. Bruising noted to BUE. UO avg 25-30 cc/hr. No BM. SCDs and heel boots in use. Pt turned q2 hr per protocol. Preventative sacral dressing intact and maintained. No falls, injury, or skin breakdown this shift. Plan of care reviewed with patient at bedside.

## 2019-10-18 NOTE — PROGRESS NOTES
Results reported to Dr Soler.   Results for MELANIE MULLER (MRN [id number]) as of 10/17/2019 20:51   Ref. Range 10/16/2019 22:21   POC PH Latest Ref Range: 7.35 - 7.45  7.275 (LL)   POC PCO2 Latest Ref Range: 35 - 45 mmHg 53.5 (H)   POC PO2 Latest Ref Range: 80 - 100 mmHg 131 (H)   POC BE Latest Ref Range: -2 to 2 mmol/L -2   POC HCO3 Latest Ref Range: 24 - 28 mmol/L 24.9   POC SATURATED O2 Latest Ref Range: 95 - 100 % 98   POC TCO2 Latest Ref Range: 23 - 27 mmol/L 26   FiO2 Unknown 28   Flow Unknown 2   Sample Unknown ARTERIAL   DelSys Unknown Nasal Can   Allens Test Unknown Pass   Site Unknown RR   Mode Unknown SPONT

## 2019-10-18 NOTE — PLAN OF CARE
10/18/19 1327   Discharge Reassessment   Assessment Type Discharge Planning Reassessment   Provided patient/caregiver education on the expected discharge date and the discharge plan Yes   Do you have any problems affording any of your prescribed medications? No   Discharge Plan A Skilled Nursing Facility  (Return to [facility])   Discharge Plan B Skilled Nursing Facility   DME Needed Upon Discharge  none   Patient choice form signed by patient/caregiver Yes   Anticipated Discharge Disposition [facility]  ([facility])   Can the patient answer the patient profile reliably? No historian available   How does the patient rate their overall health at the present time? Good   Describe the patient's ability to walk at the present time. Walks with the help of equipment   How often would a person be available to care for the patient? Never   Number of comorbid conditions (as recorded on the chart) Two   During the past month, has the patient often been bothered by feeling down, depressed or hopeless? No   During the past month, has the patient often been bothered by little interest or pleasure in doing things? No   Post-Acute Status   Post-Acute Authorization Placement   Post-Acute Placement Status Set-up Complete   Discharge Delays None known at this time

## 2019-10-18 NOTE — PLAN OF CARE
10/18/19 1502   Discharge Reassessment   Assessment Type Discharge Planning Reassessment   Provided patient/caregiver education on the expected discharge date and the discharge plan Yes   Do you have any problems affording any of your prescribed medications? No   Discharge Plan A Return to nursing home  ([facility]   Discharge Plan B Return to Nursing Home   DME Needed Upon Discharge  none   Patient choice form signed by patient/caregiver Yes   Anticipated Discharge Disposition [facility]   How does the patient rate their overall health at the present time? Good   Describe the patient's ability to walk at the present time. Walks with the help of equipment   How often would a person be available to care for the patient? Occasionally   Number of comorbid conditions (as recorded on the chart) Two   During the past month, has the patient often been bothered by feeling down, depressed or hopeless? No   During the past month, has the patient often been bothered by little interest or pleasure in doing things? No   Post-Acute Status   Post-Acute Authorization Placement  ([facility])   Post-Acute Placement Status Awaiting Internal Medical Clearance   Discharge Delays None known at this time

## 2019-10-18 NOTE — NURSING
Patient more alert and oriented this AM after wearing BiPAP overnight. Able to pass bedside swallow study and take AM meds. Will continue to monitor.

## 2019-10-18 NOTE — PROCEDURES
DATE: 10/18/19    EEG NUMBER: OW     REFERRING PHYSICIAN:  Dr. Crouch      This EEG was performed to assess for subclinical seizures      ELECTROENCEPHALOGRAM REPORT     METHODOLOGY:  Electroencephalographic (EEG) is recorded with electrodes placed according to the International 10-20 placement system.  Thirty two (32) channels of digital signal (sampling rate of 512/sec), including T1 and T2, were simultaneously recorded from the scalp and may include EKG, EMG, and/or eye monitors.  Recording band pass was 0.1 to 512 Hz.  Digital video recording of the patient is simultaneously recorded with the EEG.  The patient is instructed to report clinical symptoms which may occur during the recording session.  EEG   and video recording are stored and archived in digital format.  Activation procedures, which include photic stimulation, hyperventilation and instructing patients to perform simple tasks, are done in selected patients.     The EEG is displayed on a monitor screen and can be reviewed using different montages.  Computer-assisted analysis is employed to detect spike and electrographic seizure activity.  The entire record is submitted for computer analysis.  The entire recording is visually reviewed, and the times identified by computer analysis as being spikes or seizures are reviewed again.     Compressed spectral analysis (CSA) is also performed on the activity recorded from each individual channel.  This is displayed as a power display of frequencies from 0 to 30 Hz over time.  The CSA is reviewed looking for asymmetries in power between homologous areas of the scalp, then compared with the original EEG recording.     [employer] software was also utilized in the review of this study.  This software suite analyzes the EEG recording in multiple domains.  Coherence and rhythmicity are computed to identify EEG sections which may contain organized seizures.  Each channel undergoes analysis to detect the presence of  spike and sharp waves which have special and morphological characteristics of epileptic activity.  The routine EEG recording is converted from special into frequency domain.  This is then displayed comparing homologous areas to identify areas of significant   asymmetry.  Algorithm to identify non-cortically generated artifact is used to separate artifact from the EEG.     EEG FINDINGS:  The recording was obtained with a number of standard bipolar and referential montages during wakefulness, drowsiness and sleep.  In the alert state, the posterior background rhythm was a symmetric, well-modulated 7-8 Hz activity, which reacted symmetrically to eye opening. During drowsiness, the background rhythm waxed and waned and there were periods of slowing. There were no interictal epileptiform abnormalities and no clinical or electrographic seizures were recorded.    The EKG channel revealed an irregular rhythm.     IMPRESSION:  This is an abnormal EEG during wakefulness, drowsiness. Diffuse slowing was noted.      CLINICAL CORRELATION:  The patient is a 73 year-old female who is being evaluated for altered sensorium. The patient is currently not maintained on any antiseizure medications This is an abnormal EEG during wakefulness, drowsiness and sleep.  The overall degree of disorganization and slowing for given age is suggestive of a mild encephalopathy, likely a toxic metabolic encephalopathy.  No seizures were recorded during this study.

## 2019-10-19 LAB
ALBUMIN SERPL BCP-MCNC: 3 G/DL (ref 3.5–5.2)
ALP SERPL-CCNC: 157 U/L (ref 55–135)
ALT SERPL W/O P-5'-P-CCNC: 8 U/L (ref 10–44)
ANION GAP SERPL CALC-SCNC: 6 MMOL/L (ref 8–16)
AST SERPL-CCNC: 12 U/L (ref 10–40)
BASOPHILS # BLD AUTO: 0 K/UL (ref 0–0.2)
BASOPHILS NFR BLD: 0 % (ref 0–1.9)
BILIRUB SERPL-MCNC: 1.3 MG/DL (ref 0.1–1)
BUN SERPL-MCNC: 26 MG/DL (ref 8–23)
CALCIUM SERPL-MCNC: 8.2 MG/DL (ref 8.7–10.5)
CHLORIDE SERPL-SCNC: 105 MMOL/L (ref 95–110)
CO2 SERPL-SCNC: 21 MMOL/L (ref 23–29)
CREAT SERPL-MCNC: 1 MG/DL (ref 0.5–1.4)
DIFFERENTIAL METHOD: ABNORMAL
EOSINOPHIL # BLD AUTO: 0.1 K/UL (ref 0–0.5)
EOSINOPHIL NFR BLD: 2.7 % (ref 0–8)
ERYTHROCYTE [DISTWIDTH] IN BLOOD BY AUTOMATED COUNT: 16.1 % (ref 11.5–14.5)
EST. GFR  (AFRICAN AMERICAN): >60 ML/MIN/1.73 M^2
EST. GFR  (NON AFRICAN AMERICAN): 56 ML/MIN/1.73 M^2
GLUCOSE SERPL-MCNC: 195 MG/DL (ref 70–110)
HCT VFR BLD AUTO: 30.8 % (ref 37–48.5)
HGB BLD-MCNC: 10 G/DL (ref 12–16)
IMM GRANULOCYTES # BLD AUTO: 0.02 K/UL (ref 0–0.04)
IMM GRANULOCYTES NFR BLD AUTO: 0.4 % (ref 0–0.5)
LYMPHOCYTES # BLD AUTO: 0.6 K/UL (ref 1–4.8)
LYMPHOCYTES NFR BLD: 12.8 % (ref 18–48)
MCH RBC QN AUTO: 28.1 PG (ref 27–31)
MCHC RBC AUTO-ENTMCNC: 32.5 G/DL (ref 32–36)
MCV RBC AUTO: 87 FL (ref 82–98)
MONOCYTES # BLD AUTO: 0.4 K/UL (ref 0.3–1)
MONOCYTES NFR BLD: 7.8 % (ref 4–15)
NEUTROPHILS # BLD AUTO: 3.4 K/UL (ref 1.8–7.7)
NEUTROPHILS NFR BLD: 76.3 % (ref 38–73)
NRBC BLD-RTO: 0 /100 WBC
PLATELET # BLD AUTO: 73 K/UL (ref 150–350)
PMV BLD AUTO: 10.7 FL (ref 9.2–12.9)
POCT GLUCOSE: 148 MG/DL (ref 70–110)
POCT GLUCOSE: 170 MG/DL (ref 70–110)
POCT GLUCOSE: 187 MG/DL (ref 70–110)
POCT GLUCOSE: 187 MG/DL (ref 70–110)
POTASSIUM SERPL-SCNC: 4 MMOL/L (ref 3.5–5.1)
PROT SERPL-MCNC: 6 G/DL (ref 6–8.4)
RBC # BLD AUTO: 3.56 M/UL (ref 4–5.4)
SODIUM SERPL-SCNC: 132 MMOL/L (ref 136–145)
WBC # BLD AUTO: 4.46 K/UL (ref 3.9–12.7)

## 2019-10-19 PROCEDURE — 25000003 PHARM REV CODE 250: Performed by: STUDENT IN AN ORGANIZED HEALTH CARE EDUCATION/TRAINING PROGRAM

## 2019-10-19 PROCEDURE — 85025 COMPLETE CBC W/AUTO DIFF WBC: CPT

## 2019-10-19 PROCEDURE — 94761 N-INVAS EAR/PLS OXIMETRY MLT: CPT

## 2019-10-19 PROCEDURE — 20000000 HC ICU ROOM

## 2019-10-19 PROCEDURE — 99232 PR SUBSEQUENT HOSPITAL CARE,LEVL II: ICD-10-PCS | Mod: ,,, | Performed by: PSYCHIATRY & NEUROLOGY

## 2019-10-19 PROCEDURE — 99232 SBSQ HOSP IP/OBS MODERATE 35: CPT | Mod: ,,, | Performed by: PSYCHIATRY & NEUROLOGY

## 2019-10-19 PROCEDURE — 99900035 HC TECH TIME PER 15 MIN (STAT)

## 2019-10-19 PROCEDURE — S5010 5% DEXTROSE AND 0.45% SALINE: HCPCS | Performed by: SURGERY

## 2019-10-19 PROCEDURE — 36415 COLL VENOUS BLD VENIPUNCTURE: CPT

## 2019-10-19 PROCEDURE — 80053 COMPREHEN METABOLIC PANEL: CPT

## 2019-10-19 PROCEDURE — 94640 AIRWAY INHALATION TREATMENT: CPT

## 2019-10-19 PROCEDURE — 63600175 PHARM REV CODE 636 W HCPCS: Performed by: STUDENT IN AN ORGANIZED HEALTH CARE EDUCATION/TRAINING PROGRAM

## 2019-10-19 PROCEDURE — 25000003 PHARM REV CODE 250: Performed by: SURGERY

## 2019-10-19 PROCEDURE — 25000242 PHARM REV CODE 250 ALT 637 W/ HCPCS: Performed by: STUDENT IN AN ORGANIZED HEALTH CARE EDUCATION/TRAINING PROGRAM

## 2019-10-19 RX ADMIN — APIXABAN 5 MG: 5 TABLET, FILM COATED ORAL at 09:10

## 2019-10-19 RX ADMIN — ATORVASTATIN CALCIUM 10 MG: 10 TABLET, FILM COATED ORAL at 09:10

## 2019-10-19 RX ADMIN — ACETAMINOPHEN 650 MG: 325 TABLET, FILM COATED ORAL at 12:10

## 2019-10-19 RX ADMIN — KETOROLAC TROMETHAMINE 15 MG: 30 INJECTION, SOLUTION INTRAMUSCULAR at 06:10

## 2019-10-19 RX ADMIN — KETOROLAC TROMETHAMINE 15 MG: 30 INJECTION, SOLUTION INTRAMUSCULAR at 12:10

## 2019-10-19 RX ADMIN — MUPIROCIN 1 G: 20 OINTMENT TOPICAL at 09:10

## 2019-10-19 RX ADMIN — IPRATROPIUM BROMIDE AND ALBUTEROL SULFATE 3 ML: .5; 3 SOLUTION RESPIRATORY (INHALATION) at 07:10

## 2019-10-19 RX ADMIN — ACETAMINOPHEN 650 MG: 325 TABLET, FILM COATED ORAL at 06:10

## 2019-10-19 RX ADMIN — METOPROLOL SUCCINATE 50 MG: 50 TABLET, EXTENDED RELEASE ORAL at 09:10

## 2019-10-19 RX ADMIN — DEXTROSE AND SODIUM CHLORIDE: 5; .45 INJECTION, SOLUTION INTRAVENOUS at 05:10

## 2019-10-19 RX ADMIN — SENNOSIDES 8.6 MG: 8.6 TABLET, FILM COATED ORAL at 09:10

## 2019-10-19 RX ADMIN — PANTOPRAZOLE SODIUM 40 MG: 40 TABLET, DELAYED RELEASE ORAL at 09:10

## 2019-10-19 RX ADMIN — IPRATROPIUM BROMIDE AND ALBUTEROL SULFATE 3 ML: .5; 3 SOLUTION RESPIRATORY (INHALATION) at 08:10

## 2019-10-19 RX ADMIN — IPRATROPIUM BROMIDE AND ALBUTEROL SULFATE 3 ML: .5; 3 SOLUTION RESPIRATORY (INHALATION) at 01:10

## 2019-10-19 RX ADMIN — DOCUSATE SODIUM 100 MG: 100 CAPSULE, LIQUID FILLED ORAL at 09:10

## 2019-10-19 NOTE — PLAN OF CARE
Problem: Oral Intake Inadequate  Goal: Improved Oral Intake  Outcome: Ongoing, Progressing  Intervention: Promote and Optimize Oral Intake  Flowsheets (Taken 10/18/2019 1913)  Oral Nutrition Promotion: other (see comments) (advance diet as tolerated)

## 2019-10-19 NOTE — PLAN OF CARE
Patient received on nasal cannula 3 lpm. Sp02 99%. BBS EQUAL diminished. Aerosol treatment given. bipap on standby at this time. Decreased nasal cannula 2 lpm. sp02 98%.  No distress noted.

## 2019-10-19 NOTE — PLAN OF CARE
Patient remains in ICU overnight. More alert this shift AAOx2-3, needs frequent reorientation to place, time, and situation, and repeats ronel phrases over and over. VSS, afebrile, afib with controlled rate on cardiac monitor. NC 3L while awake and BiPAP overnight at FiO2 40%, 10/5. Pt tolerating well. R abdominal incision and dressings CDI. No symptoms of bleeding. R side of abdomen remains more swollen than left, MD aware. Bowel sounds active in all four quadrants. BUE elevated to minimize dependent edema. Bruising noted to BUE. UO 400cc this shift. No BM. SCDs and heel boots in use. Pt turned q2 hr per protocol. Preventative sacral dressing replaced, blanchable redness noted to sacrum. No falls, injury, or skin breakdown this shift. Plan of care reviewed with patient at bedside.

## 2019-10-19 NOTE — CARE UPDATE
[facility]  ICU Multidisciplinary Bedside Rounds   SUMMARY     Date: 10/19/2019    Prehospitalization: Nursing Home ([facility])  Admit Date / LOS : 10/15/2019/ 4 days    Diagnosis: Colonic mass    Consults:        Active: Gen Surg, Neuro, OT, PT, RD and SW       Needed: N/A     Code Status: Prior   Advanced Directive: Not Received    LDA: BIPAP, Hernandez and PIV       Central Lines/Site/Justification:Patient Does Not Have Central Line       Urinary Cath/Order/Justification:Urinary Retention and Critically Ill in ICU    Vasopressors/Infusions:    dextrose 5 % and 0.45 % NaCl 125 mL/hr at 10/19/19 0200          GOALS: Volume/ Hemodynamic: N/A                     RASS: N/A    CAM ICU: Positive  Pain Management: IV toradol and PO acetaminophen       Pain Controlled: yes     Rhythm: A-Fib rate controlled    Respiratory Device: Bipap  And NC  Oxygen Concentration (%):  [40] 40          VTE Prophylaxis: Pharm and Mechanical  Mobility: Bedrest and A: Assist  Stress Ulcer Prophylaxis: Yes    Dietary: PO (clear liquids)  Tolerance: yes  /  Advancement: yes    Isolation: No active isolations    Restraints: No    Significant Dates:  Post Op Date: 10/15/19 colectomy of cecum  Rescue Date: 10/16/19  Imaging/ Diagnostics: CT head 10/16, cxr 10/16    Noteworthy Labs:  None ( no AM labs were ordered for today)    CBC/Anemia Labs: Coags:    Recent Labs   Lab 10/16/19  0515 10/16/19  2341 10/18/19  1138   WBC 10.87 9.14 5.09   HGB 9.3* 8.7* 7.3*   HCT 31.3* 30.0* 24.4*   * 91* 75*   MCV 89 91 90   RDW 16.7* 16.7* 16.3*    No results for input(s): PT, INR, APTT in the last 168 hours.     Chemistries:   Recent Labs   Lab 10/16/19  0515 10/16/19  2341 10/18/19  1138     138 137 136   K 4.7  4.7 4.0 3.8     107 108 108   CO2 23  23 23 21*   BUN 20  20 22 27*   CREATININE 0.8  0.8 1.0 1.1   CALCIUM 8.8  8.8 8.9 8.1*   PROT 6.7  --  5.6*   BILITOT 1.0  --  0.5   ALKPHOS 222*  --  146*   ALT 21  --   7*   AST 29  --  11   MG 1.8  --   --         Cardiac Enzymes: Ejection Fractions:    No results for input(s): CPK, CPKMB, MB, TROPONINI in the last 72 hours. No results found for: EF     POCT Glucose: HbA1c:    Recent Labs   Lab 10/17/19  1752 10/17/19  2147 10/18/19  0605 10/18/19  1244 10/18/19  1643 10/18/19  2147   POCTGLUCOSE 214* 226* 192* 178* 175* 177*    No results found for: HGBA1C     Needs from Care Team: recheck labs (minimum h+h in order to assess outcome of PRBC admin), check abd (KUB?),      ICU LOS 2d 4h  Level of Care: OK to Transfer

## 2019-10-19 NOTE — PROGRESS NOTES
[facility]  General Surgery  Progress Note    Subjective:     Interval History:   Pt underwent EEG yesterday suggestive of toxic metabolic encephalopathy  Tolerating clears   Having bowel function  Mental status the same    Post-Op Info:  Procedure(s) (LRB):  COLECTOMY, RIGHT, LAPAROSCOPIC (N/A)   4 Days Post-Op      Medications:  Continuous Infusions:   dextrose 5 % and 0.45 % NaCl 75 mL/hr at 10/19/19 0759     Scheduled Meds:   acetaminophen  650 mg Oral 4 times per day    albuterol-ipratropium  3 mL Nebulization Q6H    apixaban  5 mg Oral BID    atorvastatin  10 mg Oral Daily    docusate sodium  100 mg Oral BID    ketorolac  15 mg Intravenous Q6H    lidocaine (PF) 10 mg/ml (1%)  1 mL Intradermal Once    metoprolol succinate  50 mg Oral Daily    mupirocin  1 g Nasal BID    pantoprazole  40 mg Oral Daily    senna  8.6 mg Oral Daily     PRN Meds:sodium chloride, dextrose 50%, dextrose 50%, glucagon (human recombinant), glucose, glucose, influenza, insulin aspart U-100, metoprolol, pneumoc 13-henrique conj-dip cr(PF)     Objective:     Vital Signs (Most Recent):  Temp: 98.2 °F (36.8 °C) (10/19/19 0800)  Pulse: 85 (10/19/19 0825)  Resp: 15 (10/19/19 0825)  BP: (!) 121/58 (10/19/19 0730)  SpO2: 99 % (10/19/19 0825) Vital Signs (24h Range):  Temp:  [97.6 °F (36.4 °C)-98.5 °F (36.9 °C)] 98.2 °F (36.8 °C)  Pulse:  [75-99] 85  Resp:  [11-29] 15  SpO2:  [92 %-100 %] 99 %  BP: ()/(35-69) 121/58       Intake/Output Summary (Last 24 hours) at 10/19/2019 0827  Last data filed at 10/19/2019 0600  Gross per 24 hour   Intake 3097.5 ml   Output 830 ml   Net 2267.5 ml       Physical Exam  Gen- awake alert but slow to respond   Abd- soft, NT, ND. Incisions c/d/i     Significant Labs:  CBC:   Recent Labs   Lab 10/19/19  0800   WBC 4.46   RBC 3.56*   HGB 10.0*   HCT 30.8*   PLT 73*   MCV 87   MCH 28.1   MCHC 32.5     CMP:   Recent Labs   Lab 10/19/19  0800   *   CALCIUM 8.2*   ALBUMIN 3.0*   PROT  6.0   *   K 4.0   CO2 21*      BUN 26*   CREATININE 1.0   ALKPHOS 157*   ALT 8*   AST 12   BILITOT 1.3*       Significant Diagnostics:  None    Assessment/Plan:     Active Diagnoses:    Diagnosis Date Noted POA    PRINCIPAL PROBLEM:  Colonic mass [K63.89] 10/15/2019 Yes      Problems Resolved During this Admission:     73 YOF POD 4 s/p lap right colectomy. Transferred to ICU for AMS. HD stable. transfused 2u prbc and responded appropriately.  CT head negative. EEG abnormal.      Advance diet as tolerated   HLIV  Hernandez for urinary retention and strict I/o   PT/OT  IS/RT    Klaus Guzman MD  General Surgery  [facility]

## 2019-10-19 NOTE — PROGRESS NOTES
"[facility]  Adult Nutrition  Progress Note    SUMMARY       Recommendations    Recommendation/Intervention:   1. Advanced to cardiac diet as medically able.     Goals: PO intake to meet 50% EEN, EPN by next RD visit  Nutrition Goal Status: new  Communication of RD Recs: other (comment)(POC)    Reason for Assessment    Reason For Assessment: identified at risk by screening criteria(MST)  Diagnosis: gastrointestinal disease(Colonic mass)  Relevant Medical History: CAD, DM, Hypercholestermia, HTN, A-fib, CHF  Interdisciplinary Rounds: did not attend  General Information Comments: S/p lap right colectomy; transferred to ICU for AMS this AM. Attempted to see twice but pt with AMS and not able to answer questions. Observed pts breakfast tray in which she ate 75% on the clear liquid diet. Pt with 22# wt loss over the past 7 months. Pt has a hx of being treated for CHF during that time; possible related to fluid loss. Denies N/V/D/C. NFPE completed, no s/s of malnutrition.    Nutrition Discharge Planning: d/c on cardiac diet    Nutrition Risk Screen    Nutrition Risk Screen: other (see comments)(abd surgery, lethargy)    Nutrition/Diet History    Patient Reported Diet/Restrictions/Preferences: general  Spiritual, Cultural Beliefs, Uatsdin Practices, Values that Affect Care: no  Factors Affecting Nutritional Intake: other (see comments)(AMS)    Anthropometrics    Temp: 98.5 °F (36.9 °C)  Height Method: Stated  Height: 5' 9" (175.3 cm)  Height (inches): 69 in  Weight Method: Standard Scale  Weight: 78 kg (171 lb 15.3 oz)  Weight (lb): 171.96 lb  Ideal Body Weight (IBW), Female: 145 lb  % Ideal Body Weight, Female (lb): 118.59 lb  BMI (Calculated): 25.4  BMI Grade: 25 - 29.9 - overweight  Usual Body Weight (UBW), k.73 kg  % Usual Body Weight: 89.1  % Weight Change From Usual Weight: -11.09 %     Lab/Procedures/Meds    Pertinent Labs Reviewed: reviewed  Pertinent Labs Comments: Glu 151  Pertinent " Medications Reviewed: reviewed  Pertinent Medications Comments:   acetaminophen   atorvastatin   metoprolol succinate   pantoprazole   senna     Estimated/Assessed Needs    Weight Used For Calorie Calculations: 78 kg (171 lb 15.3 oz)  Energy Calorie Requirements (kcal): 1693 kcal/day  Energy Need Method: [place]-St Jeor(PAL 1.25)  Protein Requirements: 94 g/day(1.2 g/kg)  Weight Used For Protein Calculations: 78 kg (171 lb 15.3 oz)  Fluid Requirements (mL): 1 mL/kcal or PER MD   Estimated Fluid Requirement Method: RDA Method  RDA Method (mL): 1693     Nutrition Prescription Ordered    Current Diet Order: Clear liquid    Evaluation of Received Nutrient/Fluid Intake    I/O: 2283/1288  Energy Calories Required: not meeting needs  Protein Required: not meeting needs  Fluid Required: meeting needs  Comments: LBM: 10/17  Tolerance: tolerating  % Intake of Estimated Energy Needs: 25 - 50 %   % Meal Intake: 75 - 100 %     Nutrition Risk    Level of Risk/Frequency of Follow-up: high(2x/week)     Assessment and Plan    Nutrition Problem  Inadequate oral intake    Related to (etiology):   AMS    Signs and Symptoms (as evidenced by):   Clear liquid diet     Interventions:  Collaboration of care with other providers    Nutrition Diagnosis Status:   New    Monitor and Evaluation    Food and Nutrient Intake: energy intake, food and beverage intake  Food and Nutrient Adminstration: diet order  Anthropometric Measurements: weight, weight change, body mass index  Biochemical Data, Medical Tests and Procedures: electrolyte and renal panel, gastrointestinal profile, glucose/endocrine profile, inflammatory profile, lipid profile  Nutrition-Focused Physical Findings: overall appearance     Nutrition Follow-Up    RD Follow-up?: Yes

## 2019-10-19 NOTE — PLAN OF CARE
Patient awake and able to answer questions appropriately with some incoherent babble between interactions. A-febrile. Still on NC2L. Fluids dc'd, all VSS, still in a-fib (chronic). No gtts active. Tolerating diet advancement to [facility] soft with hearty appetite. Resting comfortably in bed watching television. 1 BM today. More confusion apparent closer to night shift. No new injuries or falls.

## 2019-10-19 NOTE — PROGRESS NOTES
"[facility]  [place]  [place] Note    Patient Name: Ivan Cruz  MRN: [id number]  Admission Date: 10/15/2019  Hospital Length of Stay: 4 days  Code Status: Prior   Attending Provider: Caleb Ellis MD  Primary Care Physician: Ezio Vivar MD   Principal Problem:Colonic mass    Subjective:     Interval History: 74 y/o female with medical Hx as listed below was admitted to this facility of right colectomy. Several days after surgery pt has been found to be lethargic or sleeping most of the time. Last night a Rapid Response was called on her. Pt has periods of alertness where she is able to communicate and follow come commands. No seizures, unilateral weakness reported.     -10/18/19 Pt was alert this mornig, talking to nurse and eating breakfast. Now she is barely arousable.    -10/19/19: Pt is alert and reposnsive. "I want to go home".    Current Neurological Medications:     Current Facility-Administered Medications   Medication Dose Route Frequency Provider Last Rate Last Dose    0.9%  NaCl infusion (for blood administration)   Intravenous Q24H PRN Klaus Guzman MD        acetaminophen tablet 650 mg  650 mg Oral 4 times per day Klaus Guzman MD   650 mg at 10/19/19 1200    albuterol-ipratropium 2.5 mg-0.5 mg/3 mL nebulizer solution 3 mL  3 mL Nebulization Q6H Klaus Guzman MD   3 mL at 10/19/19 0825    apixaban tablet 5 mg  5 mg Oral BID Klaus Guzman MD   5 mg at 10/19/19 0925    atorvastatin tablet 10 mg  10 mg Oral Daily Klaus Guzman MD   10 mg at 10/19/19 0925    dextrose 50% injection 12.5 g  12.5 g Intravenous PRN Venu Madera MD        dextrose 50% injection 25 g  25 g Intravenous PRN Venu Madera MD        docusate sodium capsule 100 mg  100 mg Oral BID Klaus Guzman MD   100 mg at 10/19/19 0925    glucagon (human recombinant) injection 1 mg  1 mg Intramuscular PRN Venu Madera MD        glucose chewable tablet 16 g  16 g Oral PRN Venu SALGUERO" MD Santy        glucose chewable tablet 24 g  24 g Oral PRN Venu Madera MD        influenza (HIGH-DOSE PF) vaccine 0.5 mL  0.5 mL Intramuscular vaccine x 1 dose Caleb Ellis MD        insulin aspart U-100 pen 0-5 Units  0-5 Units Subcutaneous QID (AC + HS) PRN Venu Madera MD   1 Units at 10/17/19 2147    lidocaine (PF) 10 mg/ml (1%) injection 10 mg  1 mL Intradermal Once Caleb Ellis MD        metoprolol injection 10 mg  10 mg Intravenous Q6H PRN Venu Madera MD        metoprolol succinate (TOPROL-XL) 24 hr tablet 50 mg  50 mg Oral Daily Caleb Ellis MD   50 mg at 10/19/19 0925    mupirocin 2 % ointment 1 g  1 g Nasal BID Caleb Ellis MD   1 g at 10/18/19 2149    pantoprazole EC tablet 40 mg  40 mg Oral Daily Klaus Guzman MD   40 mg at 10/19/19 0925    pneumoc 13-henrique conj-dip cr(PF) (PREVNAR 13 (PF)) 0.5 mL  0.5 mL Intramuscular vaccine x 1 dose Caleb Ellis MD        senna tablet 8.6 mg  8.6 mg Oral Daily Klaus Guzman MD   8.6 mg at 10/19/19 0925       Review of Systems   Respiratory: Negative for chest tightness.    Cardiovascular: Negative for chest pain.   Neurological: Negative for headaches.          Objective:     Vital Signs (Most Recent):  Temp: 97.2 °F (36.2 °C) (10/19/19 1200)  Pulse: 84 (10/19/19 1230)  Resp: (!) 34 (10/19/19 1230)  BP: (!) 111/56 (10/19/19 1230)  SpO2: 98 % (10/19/19 1230) Vital Signs (24h Range):  Temp:  [97.2 °F (36.2 °C)-98.5 °F (36.9 °C)] 97.2 °F (36.2 °C)  Pulse:  [] 84  Resp:  [11-35] 34  SpO2:  [92 %-100 %] 98 %  BP: ()/(46-69) 111/56     Weight: 91.4 kg (201 lb 8 oz)  Body mass index is 29.76 kg/m².    Physical Exam  HENT:   Head: Normocephalic.   Eyes: Right eye exhibits no discharge. Left eye exhibits no discharge.   Neck: Normal range of motion.   Cardiovascular: Normal rate.   Pulmonary/Chest: No respiratory distress.   Abdominal: Soft.   Musculoskeletal: She exhibits no edema.         NEUROLOGICAL  EXAMINATION:      MENTAL STATUS        Alert, oriented to self, situation, place        CRANIAL NERVES      CN III, IV, VI   Right pupil: Size: 3 mm. Shape: regular.   Left pupil: Size: 3 mm. Shape: regular.   Nystagmus: none   Ophthalmoparesis: none  Conjugate gaze: present     CN VII   Right facial weakness: none  Left facial weakness: none     MOTOR EXAM        Partial AROM in UE's      SENSORY EXAM        Grimaces and withdraws to noxious stimuli on four extremities      GAIT AND COORDINATION      Tremor   Resting tremor: absent        Significant Labs:   CBC:   Recent Labs   Lab 10/18/19  1138 10/19/19  0800   WBC 5.09 4.46   HGB 7.3* 10.0*   HCT 24.4* 30.8*   PLT 75* 73*     CMP:   Recent Labs   Lab 10/18/19  1138 10/19/19  0800   * 195*    132*   K 3.8 4.0    105   CO2 21* 21*   BUN 27* 26*   CREATININE 1.1 1.0   CALCIUM 8.1* 8.2*   PROT 5.6* 6.0   ALBUMIN 2.9* 3.0*   BILITOT 0.5 1.3*   ALKPHOS 146* 157*   AST 11 12   ALT 7* 8*   ANIONGAP 7* 6*   EGFRNONAA 50* 56*       Assessment and Plan:     74 y/o female consulted for AMS     1. AMS: improved this morning. Pt is alert; following commands.   Period of deep sleep may resume.           -EEG showed no ongoing seizures.   -No sedating meds.    Active Diagnoses:    Diagnosis Date Noted POA    PRINCIPAL PROBLEM:  Colonic mass [K63.89] 10/15/2019 Yes      Problems Resolved During this Admission:       VTE Risk Mitigation (From admission, onward)         Ordered     apixaban tablet 5 mg  2 times daily      10/16/19 2714                Nik Crouch MD  Neurology  [facility]

## 2019-10-19 NOTE — PLAN OF CARE
Pt is on bipap 10/5 40% and will continue to give aerosol tx as scheduled and will continue to monitor.

## 2019-10-20 LAB
POCT GLUCOSE: 139 MG/DL (ref 70–110)
POCT GLUCOSE: 140 MG/DL (ref 70–110)
POCT GLUCOSE: 143 MG/DL (ref 70–110)
POCT GLUCOSE: 165 MG/DL (ref 70–110)

## 2019-10-20 PROCEDURE — 94761 N-INVAS EAR/PLS OXIMETRY MLT: CPT

## 2019-10-20 PROCEDURE — 25000003 PHARM REV CODE 250: Performed by: STUDENT IN AN ORGANIZED HEALTH CARE EDUCATION/TRAINING PROGRAM

## 2019-10-20 PROCEDURE — 94640 AIRWAY INHALATION TREATMENT: CPT

## 2019-10-20 PROCEDURE — 25000003 PHARM REV CODE 250: Performed by: SURGERY

## 2019-10-20 PROCEDURE — 99232 PR SUBSEQUENT HOSPITAL CARE,LEVL II: ICD-10-PCS | Mod: ,,, | Performed by: PSYCHIATRY & NEUROLOGY

## 2019-10-20 PROCEDURE — 20000000 HC ICU ROOM

## 2019-10-20 PROCEDURE — 25000242 PHARM REV CODE 250 ALT 637 W/ HCPCS: Performed by: STUDENT IN AN ORGANIZED HEALTH CARE EDUCATION/TRAINING PROGRAM

## 2019-10-20 PROCEDURE — 27000221 HC OXYGEN, UP TO 24 HOURS

## 2019-10-20 PROCEDURE — 63600175 PHARM REV CODE 636 W HCPCS: Performed by: STUDENT IN AN ORGANIZED HEALTH CARE EDUCATION/TRAINING PROGRAM

## 2019-10-20 PROCEDURE — 99900038 HC OT GENERIC THERAPY SCREENING (STAT)

## 2019-10-20 PROCEDURE — 99232 SBSQ HOSP IP/OBS MODERATE 35: CPT | Mod: ,,, | Performed by: PSYCHIATRY & NEUROLOGY

## 2019-10-20 PROCEDURE — 99900035 HC TECH TIME PER 15 MIN (STAT)

## 2019-10-20 RX ORDER — FUROSEMIDE 10 MG/ML
40 INJECTION INTRAMUSCULAR; INTRAVENOUS ONCE
Status: COMPLETED | OUTPATIENT
Start: 2019-10-20 | End: 2019-10-20

## 2019-10-20 RX ORDER — FUROSEMIDE 40 MG/1
40 TABLET ORAL DAILY
Status: DISCONTINUED | OUTPATIENT
Start: 2019-10-21 | End: 2019-10-23 | Stop reason: HOSPADM

## 2019-10-20 RX ADMIN — DOCUSATE SODIUM 100 MG: 100 CAPSULE, LIQUID FILLED ORAL at 09:10

## 2019-10-20 RX ADMIN — METOPROLOL SUCCINATE 50 MG: 50 TABLET, EXTENDED RELEASE ORAL at 09:10

## 2019-10-20 RX ADMIN — APIXABAN 5 MG: 5 TABLET, FILM COATED ORAL at 09:10

## 2019-10-20 RX ADMIN — IPRATROPIUM BROMIDE AND ALBUTEROL SULFATE 3 ML: .5; 3 SOLUTION RESPIRATORY (INHALATION) at 01:10

## 2019-10-20 RX ADMIN — ATORVASTATIN CALCIUM 10 MG: 10 TABLET, FILM COATED ORAL at 09:10

## 2019-10-20 RX ADMIN — ACETAMINOPHEN 650 MG: 325 TABLET, FILM COATED ORAL at 05:10

## 2019-10-20 RX ADMIN — ACETAMINOPHEN 650 MG: 325 TABLET, FILM COATED ORAL at 12:10

## 2019-10-20 RX ADMIN — ACETAMINOPHEN 650 MG: 325 TABLET, FILM COATED ORAL at 06:10

## 2019-10-20 RX ADMIN — IPRATROPIUM BROMIDE AND ALBUTEROL SULFATE 3 ML: .5; 3 SOLUTION RESPIRATORY (INHALATION) at 08:10

## 2019-10-20 RX ADMIN — SENNOSIDES 8.6 MG: 8.6 TABLET, FILM COATED ORAL at 09:10

## 2019-10-20 RX ADMIN — PANTOPRAZOLE SODIUM 40 MG: 40 TABLET, DELAYED RELEASE ORAL at 09:10

## 2019-10-20 RX ADMIN — IPRATROPIUM BROMIDE AND ALBUTEROL SULFATE 3 ML: .5; 3 SOLUTION RESPIRATORY (INHALATION) at 07:10

## 2019-10-20 RX ADMIN — MUPIROCIN 1 G: 20 OINTMENT TOPICAL at 09:10

## 2019-10-20 RX ADMIN — FUROSEMIDE 40 MG: 10 INJECTION, SOLUTION INTRAVENOUS at 09:10

## 2019-10-20 NOTE — PT/OT/SLP PROGRESS
"Occupational Therapy  Screen    Patient Name:  Ivan Cruz   MRN:  [id number]    Pt was recently evaluated and discharged from PT services on 10/16, dependent x2 for bed mobility. OT called [facility] and communication with nurses Narcisa and Rosina:    Pt is total A for ADLs with exception of helping feed herself "sometimes." Pt is a 2 person transfer/use of renae lift. Pt has been non-ambulatory for over a year and a NH resident since ~Dec. 2018 per chart review. Pt is at her baseline and not appropriate for skilled acute OT services.     OT discussed screen with nurseCharmaine. No family present, but OT left a B/L UE PROM handout on top of blue folder for daily use.     OT rec. Back to nursing home with 24 hour assist/supervision. OT to sign off.     Tata Choudhury OT  10/20/2019  "

## 2019-10-20 NOTE — CARE UPDATE
[facility]  ICU Multidisciplinary Bedside Rounds   SUMMARY     Date: 10/20/2019    Prehospitalization: Nursing Home  Admit Date / LOS : 10/15/2019/ 5 days    Diagnosis: Colonic mass    Consults:        Active: Neuro, OT and PT       Needed: N/A     Code Status: Prior   Advanced Directive: Not Received    LDA: Hernandez and PIV       Central Lines/Site/Justification:Patient Does Not Have Central Line       Urinary Cath/Order/Justification:Critically Ill in ICU    Vasopressors/Infusions:        GOALS: Volume/ Hemodynamic: N/A                     RASS: N/A    CAM ICU: N/A  Pain Management: PO       Pain Controlled: yes     Rhythm: A-Fib    Respiratory Device: Nasal Cannula                   VTE Prophylaxis: Pharm and Mechanical  Mobility: Bedrest  Stress Ulcer Prophylaxis: Yes    Dietary: PO  Tolerance: yes      Isolation: No active isolations    Restraints: No      Noteworthy Labs:  No labs ordered    CBC/Anemia Labs: Coags:    Recent Labs   Lab 10/16/19  2341 10/18/19  1138 10/19/19  0800   WBC 9.14 5.09 4.46   HGB 8.7* 7.3* 10.0*   HCT 30.0* 24.4* 30.8*   PLT 91* 75* 73*   MCV 91 90 87   RDW 16.7* 16.3* 16.1*    No results for input(s): PT, INR, APTT in the last 168 hours.     Chemistries:   Recent Labs   Lab 10/16/19  0515 10/16/19  2341 10/18/19  1138 10/19/19  0800     138 137 136 132*   K 4.7  4.7 4.0 3.8 4.0     107 108 108 105   CO2 23  23 23 21* 21*   BUN 20  20 22 27* 26*   CREATININE 0.8  0.8 1.0 1.1 1.0   CALCIUM 8.8  8.8 8.9 8.1* 8.2*   PROT 6.7  --  5.6* 6.0   BILITOT 1.0  --  0.5 1.3*   ALKPHOS 222*  --  146* 157*   ALT 21  --  7* 8*   AST 29  --  11 12   MG 1.8  --   --   --         Cardiac Enzymes: Ejection Fractions:    No results for input(s): CPK, CPKMB, MB, TROPONINI in the last 72 hours. No results found for: EF     POCT Glucose: HbA1c:    Recent Labs   Lab 10/18/19  2147 10/19/19  0749 10/19/19  1121 10/19/19  1627 10/19/19  2108 10/20/19  0657   POCTGLUCOSE  177* 187* 187* 148* 170* 139*    No results found for: HGBA1C     Needs from Care Team: none     ICU LOS 3d 8h  Level of Care: OK to Transfer

## 2019-10-20 NOTE — PROGRESS NOTES
[facility]  General Surgery  Progress Note    Subjective:     Interval History:   No acute events overnight.     Post-Op Info:  Procedure(s) (LRB):  COLECTOMY, RIGHT, LAPAROSCOPIC (N/A)   5 Days Post-Op      Medications:  Continuous Infusions:  Scheduled Meds:   acetaminophen  650 mg Oral 4 times per day    albuterol-ipratropium  3 mL Nebulization Q6H    apixaban  5 mg Oral BID    atorvastatin  10 mg Oral Daily    docusate sodium  100 mg Oral BID    furosemide  40 mg Intravenous Once    [START ON 10/21/2019] furosemide  40 mg Oral Daily    lidocaine (PF) 10 mg/ml (1%)  1 mL Intradermal Once    metoprolol succinate  50 mg Oral Daily    mupirocin  1 g Nasal BID    pantoprazole  40 mg Oral Daily    senna  8.6 mg Oral Daily     PRN Meds:sodium chloride, dextrose 50%, dextrose 50%, glucagon (human recombinant), glucose, glucose, influenza, insulin aspart U-100, metoprolol, pneumoc 13-henrique conj-dip cr(PF)     Objective:     Vital Signs (Most Recent):  Temp: 97.7 °F (36.5 °C) (10/20/19 0320)  Pulse: 83 (10/20/19 0723)  Resp: 18 (10/20/19 0723)  BP: (!) 116/58 (10/20/19 0600)  SpO2: 100 % (10/20/19 0723) Vital Signs (24h Range):  Temp:  [97.2 °F (36.2 °C)-98.2 °F (36.8 °C)] 97.7 °F (36.5 °C)  Pulse:  [] 83  Resp:  [10-35] 18  SpO2:  [96 %-100 %] 100 %  BP: (100-142)/(53-68) 116/58       Intake/Output Summary (Last 24 hours) at 10/20/2019 0806  Last data filed at 10/20/2019 0600  Gross per 24 hour   Intake --   Output 1435 ml   Net -1435 ml       Physical Exam  Gen- awake alert but slow to respond   Abd- soft, NT, ND. Incisions c/d/i   Significant Labs:  CBC:   Recent Labs   Lab 10/19/19  0800   WBC 4.46   RBC 3.56*   HGB 10.0*   HCT 30.8*   PLT 73*   MCV 87   MCH 28.1   MCHC 32.5     CMP:   Recent Labs   Lab 10/19/19  0800   *   CALCIUM 8.2*   ALBUMIN 3.0*   PROT 6.0   *   K 4.0   CO2 21*      BUN 26*   CREATININE 1.0   ALKPHOS 157*   ALT 8*   AST 12   BILITOT 1.3*        Significant Diagnostics:  None    Assessment/Plan:     Active Diagnoses:    Diagnosis Date Noted POA    PRINCIPAL PROBLEM:  Colonic mass [K63.89] 10/15/2019 Yes      Problems Resolved During this Admission:     73 YOF POD 5 s/p lap right colectomy. Transferred to ICU for AMS. HD stable.  CT head negative. EEG with no seizures.      Advance diet as tolerated   Hernandez for urinary retention and strict I/o   PT/OT  IS/RT  May resume home brayden Guzman MD  General Surgery  [facility]

## 2019-10-20 NOTE — PLAN OF CARE
Patient is currently on a 2 lpm nasal cannula with oxygen saturation of 98%.  No apparent distress noted at present time.  Will administer Respiratory treatment as ordered.  Will continue to monitor.

## 2019-10-20 NOTE — CARE UPDATE
[facility]  ICU Multidisciplinary Bedside Rounds     UPDATE     Date: 10/20/2019      Plan of care reviewed with the following, Charge Nurse and Physician.       Needs/ Goals for the day: Continue diet, tolerating. Pt with continued confused speech. Edema noted to extremities and perineal area +2/+3. IVF stopped yesterday. PT with + Bm's. Increase activity consult to PT/OT.      Level of Care: Critical Care

## 2019-10-20 NOTE — PLAN OF CARE
Patient was recently eval & VA'ed by Physical Therapy on 10/16/2016.  Called [facility] and spoke to Nurse Demetrice who stipulates that Patient has been non-ambulatory for over a year, requires a renae lift for transfers, and total assist for ADL's.  Pt at prior level of function, not appropriate for acute skilled PT services at this time.    Venu Arauz, PT  10/20/2019

## 2019-10-21 LAB
ALBUMIN SERPL BCP-MCNC: 2.9 G/DL (ref 3.5–5.2)
ALP SERPL-CCNC: 144 U/L (ref 55–135)
ALT SERPL W/O P-5'-P-CCNC: 8 U/L (ref 10–44)
ANION GAP SERPL CALC-SCNC: 5 MMOL/L (ref 8–16)
AST SERPL-CCNC: 11 U/L (ref 10–40)
BASOPHILS # BLD AUTO: 0.01 K/UL (ref 0–0.2)
BASOPHILS NFR BLD: 0.2 % (ref 0–1.9)
BILIRUB SERPL-MCNC: 1 MG/DL (ref 0.1–1)
BUN SERPL-MCNC: 18 MG/DL (ref 8–23)
CALCIUM SERPL-MCNC: 8.6 MG/DL (ref 8.7–10.5)
CHLORIDE SERPL-SCNC: 110 MMOL/L (ref 95–110)
CO2 SERPL-SCNC: 26 MMOL/L (ref 23–29)
CREAT SERPL-MCNC: 0.7 MG/DL (ref 0.5–1.4)
DIFFERENTIAL METHOD: ABNORMAL
EOSINOPHIL # BLD AUTO: 0.1 K/UL (ref 0–0.5)
EOSINOPHIL NFR BLD: 2.9 % (ref 0–8)
ERYTHROCYTE [DISTWIDTH] IN BLOOD BY AUTOMATED COUNT: 16.4 % (ref 11.5–14.5)
EST. GFR  (AFRICAN AMERICAN): >60 ML/MIN/1.73 M^2
EST. GFR  (NON AFRICAN AMERICAN): >60 ML/MIN/1.73 M^2
GLUCOSE SERPL-MCNC: 146 MG/DL (ref 70–110)
HCT VFR BLD AUTO: 31.5 % (ref 37–48.5)
HGB BLD-MCNC: 9.9 G/DL (ref 12–16)
IMM GRANULOCYTES # BLD AUTO: 0.02 K/UL (ref 0–0.04)
IMM GRANULOCYTES NFR BLD AUTO: 0.5 % (ref 0–0.5)
LYMPHOCYTES # BLD AUTO: 0.7 K/UL (ref 1–4.8)
LYMPHOCYTES NFR BLD: 16 % (ref 18–48)
MCH RBC QN AUTO: 28 PG (ref 27–31)
MCHC RBC AUTO-ENTMCNC: 31.4 G/DL (ref 32–36)
MCV RBC AUTO: 89 FL (ref 82–98)
MONOCYTES # BLD AUTO: 0.4 K/UL (ref 0.3–1)
MONOCYTES NFR BLD: 9.2 % (ref 4–15)
NEUTROPHILS # BLD AUTO: 2.9 K/UL (ref 1.8–7.7)
NEUTROPHILS NFR BLD: 71.2 % (ref 38–73)
NRBC BLD-RTO: 0 /100 WBC
PLATELET # BLD AUTO: 67 K/UL (ref 150–350)
PMV BLD AUTO: 10 FL (ref 9.2–12.9)
POCT GLUCOSE: 141 MG/DL (ref 70–110)
POCT GLUCOSE: 151 MG/DL (ref 70–110)
POCT GLUCOSE: 162 MG/DL (ref 70–110)
POCT GLUCOSE: 196 MG/DL (ref 70–110)
POTASSIUM SERPL-SCNC: 4.2 MMOL/L (ref 3.5–5.1)
PROT SERPL-MCNC: 5.8 G/DL (ref 6–8.4)
RBC # BLD AUTO: 3.54 M/UL (ref 4–5.4)
SODIUM SERPL-SCNC: 141 MMOL/L (ref 136–145)
WBC # BLD AUTO: 4.13 K/UL (ref 3.9–12.7)

## 2019-10-21 PROCEDURE — 85025 COMPLETE CBC W/AUTO DIFF WBC: CPT

## 2019-10-21 PROCEDURE — 27000221 HC OXYGEN, UP TO 24 HOURS

## 2019-10-21 PROCEDURE — 25000003 PHARM REV CODE 250: Performed by: SURGERY

## 2019-10-21 PROCEDURE — 25000242 PHARM REV CODE 250 ALT 637 W/ HCPCS: Performed by: STUDENT IN AN ORGANIZED HEALTH CARE EDUCATION/TRAINING PROGRAM

## 2019-10-21 PROCEDURE — 99900035 HC TECH TIME PER 15 MIN (STAT)

## 2019-10-21 PROCEDURE — 94761 N-INVAS EAR/PLS OXIMETRY MLT: CPT

## 2019-10-21 PROCEDURE — 11000001 HC ACUTE MED/SURG PRIVATE ROOM

## 2019-10-21 PROCEDURE — 25000003 PHARM REV CODE 250: Performed by: STUDENT IN AN ORGANIZED HEALTH CARE EDUCATION/TRAINING PROGRAM

## 2019-10-21 PROCEDURE — 36415 COLL VENOUS BLD VENIPUNCTURE: CPT

## 2019-10-21 PROCEDURE — 94640 AIRWAY INHALATION TREATMENT: CPT

## 2019-10-21 PROCEDURE — 80053 COMPREHEN METABOLIC PANEL: CPT

## 2019-10-21 RX ADMIN — IPRATROPIUM BROMIDE AND ALBUTEROL SULFATE 3 ML: .5; 3 SOLUTION RESPIRATORY (INHALATION) at 12:10

## 2019-10-21 RX ADMIN — ACETAMINOPHEN 650 MG: 325 TABLET, FILM COATED ORAL at 06:10

## 2019-10-21 RX ADMIN — FUROSEMIDE 40 MG: 40 TABLET ORAL at 09:10

## 2019-10-21 RX ADMIN — ACETAMINOPHEN 650 MG: 325 TABLET, FILM COATED ORAL at 01:10

## 2019-10-21 RX ADMIN — ATORVASTATIN CALCIUM 10 MG: 10 TABLET, FILM COATED ORAL at 09:10

## 2019-10-21 RX ADMIN — IPRATROPIUM BROMIDE AND ALBUTEROL SULFATE 3 ML: .5; 3 SOLUTION RESPIRATORY (INHALATION) at 08:10

## 2019-10-21 RX ADMIN — APIXABAN 5 MG: 5 TABLET, FILM COATED ORAL at 08:10

## 2019-10-21 RX ADMIN — METOPROLOL SUCCINATE 50 MG: 50 TABLET, EXTENDED RELEASE ORAL at 09:10

## 2019-10-21 RX ADMIN — IPRATROPIUM BROMIDE AND ALBUTEROL SULFATE 3 ML: .5; 3 SOLUTION RESPIRATORY (INHALATION) at 01:10

## 2019-10-21 RX ADMIN — ACETAMINOPHEN 650 MG: 325 TABLET, FILM COATED ORAL at 05:10

## 2019-10-21 RX ADMIN — PANTOPRAZOLE SODIUM 40 MG: 40 TABLET, DELAYED RELEASE ORAL at 09:10

## 2019-10-21 RX ADMIN — SENNOSIDES 8.6 MG: 8.6 TABLET, FILM COATED ORAL at 09:10

## 2019-10-21 RX ADMIN — DOCUSATE SODIUM 100 MG: 100 CAPSULE, LIQUID FILLED ORAL at 08:10

## 2019-10-21 RX ADMIN — APIXABAN 5 MG: 5 TABLET, FILM COATED ORAL at 09:10

## 2019-10-21 RX ADMIN — DOCUSATE SODIUM 100 MG: 100 CAPSULE, LIQUID FILLED ORAL at 09:10

## 2019-10-21 NOTE — CARE UPDATE
[facility]  ICU Multidisciplinary Bedside Rounds   SUMMARY     Date: 10/21/2019    Prehospitalization: Nursing Home  Admit Date / LOS : 10/15/2019/ 6 days    Diagnosis: Colonic mass    Consults:        Active: Neuro       Needed: N/A     Code Status: Prior   Advanced Directive: Not Received    LDA: Hernandez and PIV       Central Lines/Site/Justification:Patient Does Not Have Central Line       Urinary Cath/Order/Justification:Critically Ill in ICU    Vasopressors/Infusions:        GOALS: Volume/ Hemodynamic: N/A                     RASS: N/A    CAM ICU: Negative  Pain Management: PO       Pain Controlled: yes     Rhythm: A-Fib    Respiratory Device: Nasal Cannula                 VTE Prophylaxis: Pharm and Mechanical  Mobility: Bedrest  Stress Ulcer Prophylaxis: Yes    Dietary: PO  Tolerance: yes  /  Advancement: no    Isolation: No active isolations    Restraints: No    Significant Dates:  Post Op Date: 10/15/19 colectomy  Rescue Date: 10/16/19  Imaging/ Diagnostics: N/A    Noteworthy Labs:  none    CBC/Anemia Labs: Coags:    Recent Labs   Lab 10/16/19  2341 10/18/19  1138 10/19/19  0800   WBC 9.14 5.09 4.46   HGB 8.7* 7.3* 10.0*   HCT 30.0* 24.4* 30.8*   PLT 91* 75* 73*   MCV 91 90 87   RDW 16.7* 16.3* 16.1*    No results for input(s): PT, INR, APTT in the last 168 hours.     Chemistries:   Recent Labs   Lab 10/16/19  0515 10/16/19  2341 10/18/19  1138 10/19/19  0800     138 137 136 132*   K 4.7  4.7 4.0 3.8 4.0     107 108 108 105   CO2 23  23 23 21* 21*   BUN 20  20 22 27* 26*   CREATININE 0.8  0.8 1.0 1.1 1.0   CALCIUM 8.8  8.8 8.9 8.1* 8.2*   PROT 6.7  --  5.6* 6.0   BILITOT 1.0  --  0.5 1.3*   ALKPHOS 222*  --  146* 157*   ALT 21  --  7* 8*   AST 29  --  11 12   MG 1.8  --   --   --         Cardiac Enzymes: Ejection Fractions:    No results for input(s): CPK, CPKMB, MB, TROPONINI in the last 72 hours. No results found for: EF     POCT Glucose: HbA1c:    Recent Labs   Lab  10/19/19  1627 10/19/19  2108 10/20/19  0657 10/20/19  1203 10/20/19  1626 10/20/19  2209   POCTGLUCOSE 148* 170* 139* 165* 143* 140*    No results found for: HGBA1C     Needs from Care Team: none     ICU LOS 4d 6h  Level of Care: OK to Transfer

## 2019-10-21 NOTE — PLAN OF CARE
Pt remains in ICU on 1L NC. Refused BiPAP tonight. VSS. Pt still disoriented to time and situation. No pain reported this shift. Hernandez in place draining adequate urine. Turned Q2 hours. No falls, injuries or skin breakdown, precautions maintained for all.

## 2019-10-21 NOTE — PROGRESS NOTES
"[facility]  Neurology  Progress Note    Patient Name: Ivan Cruz  MRN: [id number]  Admission Date: 10/15/2019  Hospital Length of Stay: 6 days  Code Status: Prior   Attending Provider: Caleb Ellis MD  Primary Care Physician: Ezio Vivar MD   Principal Problem:Colonic mass    Subjective:     Interval History: 74 y/o female with medical Hx as listed below was admitted to this facility of right colectomy. Several days after surgery pt has been found to be lethargic or sleeping most of the time. Last night a Rapid Response was called on her. Pt has periods of alertness where she is able to communicate and follow come commands. No seizures, unilateral weakness reported.     -10/18/19 Pt was alert this mornig, talking to nurse and eating breakfast. Now she is barely arousable.     -10/19/19: Pt is alert and reposnsive. "I want to go home".     -10/20/19: Pt remains alert. Follows commands.    Current Neurological Medications:     Current Facility-Administered Medications   Medication Dose Route Frequency Provider Last Rate Last Dose    0.9%  NaCl infusion (for blood administration)   Intravenous Q24H PRN Klaus Guzman MD        acetaminophen tablet 650 mg  650 mg Oral 4 times per day Klaus Guzman MD   650 mg at 10/21/19 0656    albuterol-ipratropium 2.5 mg-0.5 mg/3 mL nebulizer solution 3 mL  3 mL Nebulization Q6H Klaus Guzman MD   3 mL at 10/21/19 0805    apixaban tablet 5 mg  5 mg Oral BID Klaus Guzman MD   5 mg at 10/20/19 2121    atorvastatin tablet 10 mg  10 mg Oral Daily Klaus Guzman MD   10 mg at 10/20/19 0918    dextrose 50% injection 12.5 g  12.5 g Intravenous PRN Venu Madera MD        dextrose 50% injection 25 g  25 g Intravenous PRN Venu Madera MD        docusate sodium capsule 100 mg  100 mg Oral BID Klaus Guzman MD   100 mg at 10/20/19 2121    furosemide tablet 40 mg  40 mg Oral Daily Klaus Guzman MD        glucagon (human recombinant) " injection 1 mg  1 mg Intramuscular PRN Venu Madera MD        glucose chewable tablet 16 g  16 g Oral PRN Venu Madera MD        glucose chewable tablet 24 g  24 g Oral PRN Venu Madera MD        influenza (HIGH-DOSE PF) vaccine 0.5 mL  0.5 mL Intramuscular vaccine x 1 dose Caleb Ellis MD        insulin aspart U-100 pen 0-5 Units  0-5 Units Subcutaneous QID (AC + HS) PRN Venu Madera MD   1 Units at 10/17/19 2147    lidocaine (PF) 10 mg/ml (1%) injection 10 mg  1 mL Intradermal Once Caleb Ellis MD        metoprolol injection 10 mg  10 mg Intravenous Q6H PRN Venu Madera MD        metoprolol succinate (TOPROL-XL) 24 hr tablet 50 mg  50 mg Oral Daily Caleb Ellis MD   50 mg at 10/20/19 0918    pantoprazole EC tablet 40 mg  40 mg Oral Daily Klaus Guzman MD   40 mg at 10/20/19 0918    pneumoc 13-henrique conj-dip cr(PF) (PREVNAR 13 (PF)) 0.5 mL  0.5 mL Intramuscular vaccine x 1 dose Caleb Ellis MD        senna tablet 8.6 mg  8.6 mg Oral Daily Klaus Guzman MD   8.6 mg at 10/20/19 0918       Review of Systems   Constitutional: Negative for fever.   HENT: Negative for trouble swallowing.    Eyes: Negative for photophobia.   Respiratory: Negative for chest tightness.    Cardiovascular: Negative for chest pain.   Gastrointestinal: Negative for abdominal pain.   Genitourinary: Negative for dysuria.   Musculoskeletal: Negative for back pain.   Neurological: Negative for headaches.          Objective:     Vital Signs (Most Recent):  Temp: 98.9 °F (37.2 °C) (10/21/19 0700)  Pulse: 82 (10/21/19 0805)  Resp: 17 (10/21/19 0805)  BP: (!) 102/51 (10/21/19 0800)  SpO2: 98 % (10/21/19 0805) Vital Signs (24h Range):  Temp:  [98.1 °F (36.7 °C)-99 °F (37.2 °C)] 98.9 °F (37.2 °C)  Pulse:  [] 82  Resp:  [12-28] 17  SpO2:  [93 %-100 %] 98 %  BP: ()/(45-89) 102/51     Weight: 91.4 kg (201 lb 8 oz)  Body mass index is 29.76 kg/m².    Physical Exam  HENT:    Head: Normocephalic.   Eyes: Right eye exhibits no discharge. Left eye exhibits no discharge.   Neck: Normal range of motion.   Cardiovascular: Normal rate.   Pulmonary/Chest: No respiratory distress.   Abdominal: Soft.   Musculoskeletal: She exhibits no edema.         NEUROLOGICAL EXAMINATION:      MENTAL STATUS        Alert, oriented to self, situation, place        CRANIAL NERVES      CN III, IV, VI   Right pupil: Size: 3 mm. Shape: regular.   Left pupil: Size: 3 mm. Shape: regular.   Nystagmus: none   Ophthalmoparesis: none  Conjugate gaze: present     CN VII   Right facial weakness: none  Left facial weakness: none     MOTOR EXAM        Partial AROM in UE's      SENSORY EXAM        Grimaces and withdraws to noxious stimuli on four extremities      GAIT AND COORDINATION      Tremor   Resting tremor: absent             Significant Labs:   CBC:   Recent Labs   Lab 10/21/19  0733   WBC 4.13   HGB 9.9*   HCT 31.5*   PLT 67*     CMP:   Recent Labs   Lab 10/21/19  0733   *      K 4.2      CO2 26   BUN 18   CREATININE 0.7   CALCIUM 8.6*   PROT 5.8*   ALBUMIN 2.9*   BILITOT 1.0   ALKPHOS 144*   AST 11   ALT 8*   ANIONGAP 5*   EGFRNONAA >60         Assessment and Plan:     74 y/o female consulted for AMS     1. AMS: improved this morning. Pt is alert; following commands.           No focal finding on exam.           -EEG showed no ongoing seizures.           -No sedating meds.   -Ramelteon 8 mg nightly may help to prevent delirium and regulate sleep cycles.    Active Diagnoses:    Diagnosis Date Noted POA    PRINCIPAL PROBLEM:  Colonic mass [K63.89] 10/15/2019 Yes      Problems Resolved During this Admission:       VTE Risk Mitigation (From admission, onward)         Ordered     apixaban tablet 5 mg  2 times daily      10/16/19 3531                Nik Crouch MD  Neurology  [facility]

## 2019-10-21 NOTE — PROGRESS NOTES
1226 TN contacted [facility] at [phone] to provide an update, left a message; will send updated clinicals.    5568 TN sent clinicals to [facility] via [employer]; sent note in [employer] clarifying whether pt is SNF or [facility].

## 2019-10-21 NOTE — PLAN OF CARE
Pt remains in ICU. Lasix given with adequate diuresis. Pt alert, but remains confused, which according to family is her baseline. Poor appetite. No skin breakdown, no falls

## 2019-10-21 NOTE — PROGRESS NOTES
[facility]  General Surgery  Progress Note    Subjective:     Interval History:   No acute events overnight.     Post-Op Info:  Procedure(s) (LRB):  COLECTOMY, RIGHT, LAPAROSCOPIC (N/A)   6 Days Post-Op      Medications:  Continuous Infusions:  Scheduled Meds:   acetaminophen  650 mg Oral 4 times per day    albuterol-ipratropium  3 mL Nebulization Q6H    apixaban  5 mg Oral BID    atorvastatin  10 mg Oral Daily    docusate sodium  100 mg Oral BID    furosemide  40 mg Oral Daily    lidocaine (PF) 10 mg/ml (1%)  1 mL Intradermal Once    metoprolol succinate  50 mg Oral Daily    pantoprazole  40 mg Oral Daily    senna  8.6 mg Oral Daily     PRN Meds:sodium chloride, dextrose 50%, dextrose 50%, glucagon (human recombinant), glucose, glucose, influenza, insulin aspart U-100, metoprolol, pneumoc 13-henrique conj-dip cr(PF)     Objective:     Vital Signs (Most Recent):  Temp: 98.2 °F (36.8 °C) (10/21/19 0400)  Pulse: 78 (10/21/19 0700)  Resp: (!) 24 (10/21/19 0700)  BP: 134/61 (10/21/19 0700)  SpO2: 100 % (10/21/19 0700) Vital Signs (24h Range):  Temp:  [98.1 °F (36.7 °C)-99 °F (37.2 °C)] 98.2 °F (36.8 °C)  Pulse:  [] 78  Resp:  [12-54] 24  SpO2:  [93 %-100 %] 100 %  BP: ()/(45-89) 134/61       Intake/Output Summary (Last 24 hours) at 10/21/2019 0733  Last data filed at 10/21/2019 0600  Gross per 24 hour   Intake 358 ml   Output 3650 ml   Net -3292 ml       Physical Exam  Gen- awake alert but slow to respond   Abd- soft, NT, ND. Incisions c/d/i   Significant Labs:  CBC:   Recent Labs   Lab 10/19/19  0800   WBC 4.46   RBC 3.56*   HGB 10.0*   HCT 30.8*   PLT 73*   MCV 87   MCH 28.1   MCHC 32.5     CMP:   Recent Labs   Lab 10/19/19  0800   *   CALCIUM 8.2*   ALBUMIN 3.0*   PROT 6.0   *   K 4.0   CO2 21*      BUN 26*   CREATININE 1.0   ALKPHOS 157*   ALT 8*   AST 12   BILITOT 1.3*       Significant Diagnostics:  None    Assessment/Plan:     Active Diagnoses:    Diagnosis  Date Noted POA    PRINCIPAL PROBLEM:  Colonic mass [K63.89] 10/15/2019 Yes      Problems Resolved During this Admission:     73 YOF POD 6 s/p lap right colectomy. Transferred to ICU for AMS. HD stable.  CT head negative. EEG with no seizures.      Advance diet as tolerated   PT/OT  IS/RT  Transfer to floor       Klaus Guzman MD  General Surgery  [facility]

## 2019-10-21 NOTE — NURSING TRANSFER
Nursing Transfer Note      10/21/2019     Transfer To: 413A    Transfer via bed    Transfer with cardiac monitoring    Transported by LILI Shultz RN and transport    Medicines sent: novolog    Chart send with patient: Yes    Notified: sister    Patient reassessed at: 1100 10/21/2019     Upon arrival to floor: cardiac monitor applied prior to leaving unit, patient oriented to room, call bell in reach and bed in lowest position

## 2019-10-21 NOTE — PLAN OF CARE
Problem: Adult Inpatient Plan of Care  Goal: Plan of Care Review  Outcome: Ongoing, Progressing  Flowsheets (Taken 10/21/2019 6305)  Plan of Care Reviewed With: patient   Pt free from falls, injury or any further trauma throughout shift. Pt AAOx4. Continued medications as ordered. Complaints of pain to sacrum, controlled with repositioning q2hrs and po tylenol. Foam dressing to sacrum. Dressing to abd C/D/I. Hernandez in place for urinary retention. Pt in no distress. Will cont to monitor.

## 2019-10-21 NOTE — PLAN OF CARE
10/21/19 1222   Discharge Reassessment   Assessment Type Discharge Planning Reassessment   Provided patient/caregiver education on the expected discharge date and the discharge plan Yes   Do you have any problems affording any of your prescribed medications? No   Discharge Plan A Return to nursing home  ([facility])   DME Needed Upon Discharge  none   Patient choice form signed by patient/caregiver Yes   [facility]ticipated Discharge Disposition [facility]  ([facility])   Can the patient answer the patient profile reliably? No historian available   How does the patient rate their overall health at the present time? Good   Describe the patient's ability to walk at the present time. Walks with the help of equipment   How often would a person be available to care for the patient? Occasionally   Number of comorbid conditions (as recorded on the chart) Three   During the past month, has the patient often been bothered by feeling down, depressed or hopeless? No   During the past month, has the patient often been bothered by little interest or pleasure in doing things? No   Post-Acute Status   Post-Acute Authorization Placement  (Nursing Home-[facility])   Post-Acute Placement Status Awaiting Internal Medical Clearance

## 2019-10-21 NOTE — NURSING
Hernandez previous in place for urinary retention, order to remove per Dr. Guzman. Will cont to monitor.

## 2019-10-22 LAB
ALBUMIN SERPL BCP-MCNC: 2.9 G/DL (ref 3.5–5.2)
ALP SERPL-CCNC: 134 U/L (ref 55–135)
ALT SERPL W/O P-5'-P-CCNC: 8 U/L (ref 10–44)
ANION GAP SERPL CALC-SCNC: 7 MMOL/L (ref 8–16)
AST SERPL-CCNC: 11 U/L (ref 10–40)
BASOPHILS # BLD AUTO: 0.01 K/UL (ref 0–0.2)
BASOPHILS NFR BLD: 0.2 % (ref 0–1.9)
BILIRUB SERPL-MCNC: 1.1 MG/DL (ref 0.1–1)
BUN SERPL-MCNC: 15 MG/DL (ref 8–23)
CALCIUM SERPL-MCNC: 8.5 MG/DL (ref 8.7–10.5)
CHLORIDE SERPL-SCNC: 107 MMOL/L (ref 95–110)
CO2 SERPL-SCNC: 24 MMOL/L (ref 23–29)
CREAT SERPL-MCNC: 0.7 MG/DL (ref 0.5–1.4)
DIFFERENTIAL METHOD: ABNORMAL
EOSINOPHIL # BLD AUTO: 0.1 K/UL (ref 0–0.5)
EOSINOPHIL NFR BLD: 2.3 % (ref 0–8)
ERYTHROCYTE [DISTWIDTH] IN BLOOD BY AUTOMATED COUNT: 16.6 % (ref 11.5–14.5)
EST. GFR  (AFRICAN AMERICAN): >60 ML/MIN/1.73 M^2
EST. GFR  (NON AFRICAN AMERICAN): >60 ML/MIN/1.73 M^2
GLUCOSE SERPL-MCNC: 164 MG/DL (ref 70–110)
HCT VFR BLD AUTO: 30 % (ref 37–48.5)
HGB BLD-MCNC: 9.4 G/DL (ref 12–16)
IMM GRANULOCYTES # BLD AUTO: 0.01 K/UL (ref 0–0.04)
IMM GRANULOCYTES NFR BLD AUTO: 0.2 % (ref 0–0.5)
LYMPHOCYTES # BLD AUTO: 0.7 K/UL (ref 1–4.8)
LYMPHOCYTES NFR BLD: 15 % (ref 18–48)
MCH RBC QN AUTO: 27.4 PG (ref 27–31)
MCHC RBC AUTO-ENTMCNC: 31.3 G/DL (ref 32–36)
MCV RBC AUTO: 88 FL (ref 82–98)
MONOCYTES # BLD AUTO: 0.3 K/UL (ref 0.3–1)
MONOCYTES NFR BLD: 7.6 % (ref 4–15)
NEUTROPHILS # BLD AUTO: 3.2 K/UL (ref 1.8–7.7)
NEUTROPHILS NFR BLD: 74.7 % (ref 38–73)
NRBC BLD-RTO: 0 /100 WBC
PLATELET # BLD AUTO: 73 K/UL (ref 150–350)
PMV BLD AUTO: 9.9 FL (ref 9.2–12.9)
POCT GLUCOSE: 158 MG/DL (ref 70–110)
POCT GLUCOSE: 162 MG/DL (ref 70–110)
POCT GLUCOSE: 174 MG/DL (ref 70–110)
POCT GLUCOSE: 194 MG/DL (ref 70–110)
POTASSIUM SERPL-SCNC: 3.7 MMOL/L (ref 3.5–5.1)
PROT SERPL-MCNC: 5.8 G/DL (ref 6–8.4)
RBC # BLD AUTO: 3.43 M/UL (ref 4–5.4)
SODIUM SERPL-SCNC: 138 MMOL/L (ref 136–145)
WBC # BLD AUTO: 4.33 K/UL (ref 3.9–12.7)

## 2019-10-22 PROCEDURE — 25000242 PHARM REV CODE 250 ALT 637 W/ HCPCS: Performed by: STUDENT IN AN ORGANIZED HEALTH CARE EDUCATION/TRAINING PROGRAM

## 2019-10-22 PROCEDURE — 94640 AIRWAY INHALATION TREATMENT: CPT

## 2019-10-22 PROCEDURE — 80053 COMPREHEN METABOLIC PANEL: CPT

## 2019-10-22 PROCEDURE — 36415 COLL VENOUS BLD VENIPUNCTURE: CPT

## 2019-10-22 PROCEDURE — 97803 MED NUTRITION INDIV SUBSEQ: CPT

## 2019-10-22 PROCEDURE — 25000003 PHARM REV CODE 250: Performed by: STUDENT IN AN ORGANIZED HEALTH CARE EDUCATION/TRAINING PROGRAM

## 2019-10-22 PROCEDURE — 85025 COMPLETE CBC W/AUTO DIFF WBC: CPT

## 2019-10-22 PROCEDURE — 99900035 HC TECH TIME PER 15 MIN (STAT)

## 2019-10-22 PROCEDURE — 94761 N-INVAS EAR/PLS OXIMETRY MLT: CPT

## 2019-10-22 PROCEDURE — 11000001 HC ACUTE MED/SURG PRIVATE ROOM

## 2019-10-22 RX ORDER — GABAPENTIN 300 MG/1
300 CAPSULE ORAL 3 TIMES DAILY
Status: DISCONTINUED | OUTPATIENT
Start: 2019-10-22 | End: 2019-10-22

## 2019-10-22 RX ORDER — KETOROLAC TROMETHAMINE 30 MG/ML
15 INJECTION, SOLUTION INTRAMUSCULAR; INTRAVENOUS ONCE
Status: COMPLETED | OUTPATIENT
Start: 2019-10-22 | End: 2019-10-22

## 2019-10-22 RX ORDER — GABAPENTIN 100 MG/1
100 CAPSULE ORAL 3 TIMES DAILY
Status: DISCONTINUED | OUTPATIENT
Start: 2019-10-22 | End: 2019-10-23 | Stop reason: HOSPADM

## 2019-10-22 RX ORDER — TRAMADOL HYDROCHLORIDE 50 MG/1
50 TABLET ORAL EVERY 6 HOURS PRN
Status: DISCONTINUED | OUTPATIENT
Start: 2019-10-22 | End: 2019-10-22

## 2019-10-22 RX ADMIN — DOCUSATE SODIUM 100 MG: 100 CAPSULE, LIQUID FILLED ORAL at 09:10

## 2019-10-22 RX ADMIN — DOCUSATE SODIUM 100 MG: 100 CAPSULE, LIQUID FILLED ORAL at 08:10

## 2019-10-22 RX ADMIN — ACETAMINOPHEN 650 MG: 325 TABLET, FILM COATED ORAL at 11:10

## 2019-10-22 RX ADMIN — SENNOSIDES 8.6 MG: 8.6 TABLET, FILM COATED ORAL at 08:10

## 2019-10-22 RX ADMIN — ACETAMINOPHEN 650 MG: 325 TABLET, FILM COATED ORAL at 05:10

## 2019-10-22 RX ADMIN — IPRATROPIUM BROMIDE AND ALBUTEROL SULFATE 3 ML: .5; 3 SOLUTION RESPIRATORY (INHALATION) at 12:10

## 2019-10-22 RX ADMIN — KETOROLAC TROMETHAMINE 15 MG: 30 INJECTION, SOLUTION INTRAMUSCULAR; INTRAVENOUS at 04:10

## 2019-10-22 RX ADMIN — ACETAMINOPHEN 650 MG: 325 TABLET, FILM COATED ORAL at 01:10

## 2019-10-22 RX ADMIN — FUROSEMIDE 40 MG: 40 TABLET ORAL at 08:10

## 2019-10-22 RX ADMIN — IPRATROPIUM BROMIDE AND ALBUTEROL SULFATE 3 ML: .5; 3 SOLUTION RESPIRATORY (INHALATION) at 08:10

## 2019-10-22 RX ADMIN — IPRATROPIUM BROMIDE AND ALBUTEROL SULFATE 3 ML: .5; 3 SOLUTION RESPIRATORY (INHALATION) at 07:10

## 2019-10-22 RX ADMIN — ATORVASTATIN CALCIUM 10 MG: 10 TABLET, FILM COATED ORAL at 08:10

## 2019-10-22 RX ADMIN — METOPROLOL SUCCINATE 50 MG: 50 TABLET, EXTENDED RELEASE ORAL at 08:10

## 2019-10-22 RX ADMIN — ACETAMINOPHEN 650 MG: 325 TABLET, FILM COATED ORAL at 12:10

## 2019-10-22 RX ADMIN — GABAPENTIN 100 MG: 100 CAPSULE ORAL at 05:10

## 2019-10-22 RX ADMIN — APIXABAN 5 MG: 5 TABLET, FILM COATED ORAL at 09:10

## 2019-10-22 RX ADMIN — APIXABAN 5 MG: 5 TABLET, FILM COATED ORAL at 08:10

## 2019-10-22 RX ADMIN — ACETAMINOPHEN 650 MG: 325 TABLET, FILM COATED ORAL at 07:10

## 2019-10-22 RX ADMIN — GABAPENTIN 100 MG: 100 CAPSULE ORAL at 09:10

## 2019-10-22 RX ADMIN — IPRATROPIUM BROMIDE AND ALBUTEROL SULFATE 3 ML: .5; 3 SOLUTION RESPIRATORY (INHALATION) at 02:10

## 2019-10-22 RX ADMIN — GABAPENTIN 100 MG: 100 CAPSULE ORAL at 08:10

## 2019-10-22 NOTE — PROGRESS NOTES
[facility]  General Surgery  Progress Note    Subjective:     Interval History:   No acute events overnight.   Transferred to the floor     Post-Op Info:  Procedure(s) (LRB):  COLECTOMY, RIGHT, LAPAROSCOPIC (N/A)   7 Days Post-Op      Medications:  Continuous Infusions:  Scheduled Meds:   acetaminophen  650 mg Oral 4 times per day    albuterol-ipratropium  3 mL Nebulization Q6H    apixaban  5 mg Oral BID    atorvastatin  10 mg Oral Daily    docusate sodium  100 mg Oral BID    furosemide  40 mg Oral Daily    lidocaine (PF) 10 mg/ml (1%)  1 mL Intradermal Once    metoprolol succinate  50 mg Oral Daily    senna  8.6 mg Oral Daily     PRN Meds:dextrose 50%, dextrose 50%, glucagon (human recombinant), glucose, glucose, insulin aspart U-100, metoprolol, pneumoc 13-henrique conj-dip cr(PF)     Objective:     Vital Signs (Most Recent):  Temp: 98 °F (36.7 °C) (10/22/19 0532)  Pulse: 85 (10/22/19 0532)  Resp: 15 (10/22/19 0532)  BP: 138/89 (10/22/19 0532)  SpO2: 95 % (10/22/19 0532) Vital Signs (24h Range):  Temp:  [97.6 °F (36.4 °C)-98.3 °F (36.8 °C)] 98 °F (36.7 °C)  Pulse:  [72-91] 85  Resp:  [15-20] 15  SpO2:  [95 %-100 %] 95 %  BP: (102-138)/(51-89) 138/89       Intake/Output Summary (Last 24 hours) at 10/22/2019 0722  Last data filed at 10/22/2019 0600  Gross per 24 hour   Intake 1320 ml   Output 1375 ml   Net -55 ml       Physical Exam  Gen- awake alert but slow to respond   Abd- soft, NT, ND. Incisions c/d/i   Significant Labs:  CBC:   Recent Labs   Lab 10/22/19  0511   WBC 4.33   RBC 3.43*   HGB 9.4*   HCT 30.0*   PLT 73*   MCV 88   MCH 27.4   MCHC 31.3*     CMP:   Recent Labs   Lab 10/22/19  0511   *   CALCIUM 8.5*   ALBUMIN 2.9*   PROT 5.8*      K 3.7   CO2 24      BUN 15   CREATININE 0.7   ALKPHOS 134   ALT 8*   AST 11   BILITOT 1.1*       Significant Diagnostics:  None    Assessment/Plan:     Active Diagnoses:    Diagnosis Date Noted POA    PRINCIPAL PROBLEM:  Colonic  mass [K63.89] 10/15/2019 Yes      Problems Resolved During this Admission:     73 YOF POD 7 s/p lap right colectomy. Tolerating diet. Having bowel function.      Continue diet as tolerated   PT/OT  IS/RT  Possible dispo today back to nursing home/SNF     Klaus Guzman MD  General Surgery  [facility]

## 2019-10-22 NOTE — PLAN OF CARE
[facility]     Department of Hospital Medicine     [address] [place] [address]     [phone] after hours  [phone] fax       NURSING HOME ORDERS    10/22/2019    Admit to Nursing Home:  Regular Bed        Diagnoses:  Active Hospital Problems    Diagnosis  POA    *Colonic mass [K63.89]  Yes      Resolved Hospital Problems   No resolved problems to display.       Patient is homebound due to:  Colonic mass    Allergies:  Review of patient's allergies indicates:   Allergen Reactions    Biaxin [clarithromycin]      Doesn't know reaction       Vitals:          Once weekly      Diet: Diabetic Diet Supplement:  1 can every three times a day with meals                         Type:  Ensure        Acitivities:     - Up in a chair each morning as tolerated   - Ambulate with assistance to bathroom   - Scheduled walks once each shift (every 8 hours)   - May ambulate independently   - May use walker, cane, or self-propelled wheelchair      LABS:  None needed    Nursing Precautions:       - Fall precautions per nursing home protocol    CONSULTS:     Physical Therapy to evaluate and treat     Nutrition to evaluate and recommend diet      MISCELLANEOUS CARE:                Routine Skin for Bedridden Patients:  Apply moisture barrier cream to all    skin folds and wet areas in perineal area daily and after baths and                           all bowel movements.        WOUND CARE:   Keep incision clean and dry. May shower. Do not submerge incision underwater. No dressing needed.                         DIABETES CARE:        Check blood sugar:         Fingerstick blood sugar AC and HS         Report CBG < 60 or > 400 to physician.                                          Insulin Sliding Scale          Glucose  Novolog Insulin Subcutaneous        0 - 60   Orange juice or glucose tablet, hold insulin      No insulin   201-250  2 units   251-300  4 units   301-350  6  units   351-400  8 units   >400   10 units then call physician      Medications: Discontinue all previous medication orders, if any. See new list below.     Ivan Cruz   Home Medication Instructions ANA MARIA:[id number]    Printed on:10/22/19 0738   Medication Information                      acetaminophen (TYLENOL) 500 MG tablet  Take 500 mg by mouth every 8 (eight) hours as needed for Pain.             apixaban (ELIQUIS) 5 mg Tab  Take 5 mg by mouth 2 (two) times daily.             atorvastatin (LIPITOR) 10 MG tablet  10 mg once daily.              cholecalciferol, vitamin D3, 50,000 unit Tab  Take 1 tablet by mouth once a week.             docusate sodium (COLACE) 100 MG capsule  Take 100 mg by mouth once daily.             escitalopram oxalate (LEXAPRO) 10 MG tablet  Take 10 mg by mouth once daily.             furosemide (LASIX) 40 MG tablet  Take 40 mg by mouth once daily.             gabapentin (NEURONTIN) 100 MG capsule  Take 200 mg by mouth every evening.             glipiZIDE (GLUCOTROL) 10 MG TR24  Take 10 mg by mouth daily with breakfast.              insulin aspart U-100 (NOVOLOG FLEXPEN U-100 INSULIN) 100 unit/mL (3 mL) InPn pen  Inject into the skin.             loperamide (IMODIUM A-D) 2 mg Tab  Take 4 mg by mouth daily as needed.             melatonin 5 mg Tab  Take 5 mg by mouth every evening.             metoprolol succinate (TOPROL-XL) 50 MG 24 hr tablet  Take 50 mg by mouth once daily.             mirtazapine (REMERON) 7.5 MG Tab  Take 7.5 mg by mouth once daily.             multivitamin capsule  Take 1 capsule by mouth once daily.             oxyCODONE-acetaminophen (PERCOCET) 5-325 mg per tablet  Take 1 tablet by mouth every 4 (four) hours as needed.             pantoprazole (PROTONIX) 20 MG tablet  Take 20 mg by mouth once daily.             potassium chloride (KLOR-CON) 10 MEQ TbSR  Take 10 mEq by mouth 2 (two) times daily.             simethicone (MYLICON) 80 MG chewable tablet  Take 80  mg by mouth 2 (two) times daily.             SITagliptin (JANUVIA) 50 MG Tab  Take 50 mg by mouth once daily.                       _________________________________  Klaus Guzman MD  10/22/2019

## 2019-10-22 NOTE — PROGRESS NOTES
"[facility]  Adult Nutrition  Progress Note    SUMMARY       Recommendations    Recommendation/Intervention:   1. Recommend Haider BID to aid in wound healing.   2. Boost Glucose BID to meet nutrient needs.    Goals: PO intake to meet 50% EEN, EPN by next RD visit  Nutrition Goal Status: progressing towards goal  Communication of RD Recs: other (comment)(POC)    Reason for Assessment    Reason For Assessment: identified at risk by screening criteria(MST)  Diagnosis: gastrointestinal disease(Colonic mass)  Relevant Medical History: CAD, DM, Hypercholestermia, HTN, A-fib, CHF  Interdisciplinary Rounds: did not attend  General Information Comments: POD7 lap right colectomy; pt AMS has improve but pt still has some difficulties communicating. Pt states she has a good appetite, although she only ate ~25% of her lunch. Encouraged pt to focus on eating foods high in protein as they will help with her pressure ulcer. She does not like Haider but said she may drink it if it is sent to her because she understands it's "good" for her. NFPE completed 10/18, no s/s of malnutrition.  Nutrition Discharge Planning: d/c on cardiac diet    Nutrition Risk Screen    Nutrition Risk Screen: no indicators present    Nutrition/Diet History    Patient Reported Diet/Restrictions/Preferences: general  Spiritual, Cultural Beliefs, Mu-ism Practices, Values that Affect Care: no  Factors Affecting Nutritional Intake: other (see comments)(AMS)    Anthropometrics    Temp: 98.7 °F (37.1 °C)  Height Method: Stated  Height: 5' 9" (175.3 cm)  Height (inches): 69 in  Weight Method: Bed Scale  Weight: 91.4 kg (201 lb 8 oz)  Weight (lb): 201.5 lb  Ideal Body Weight (IBW), Female: 145 lb  % Ideal Body Weight, Female (lb): 118.59 lb  BMI (Calculated): 25.4  BMI Grade: 25 - 29.9 - overweight  Usual Body Weight (UBW), k.73 kg  % Usual Body Weight: 89.1  % Weight Change From Usual Weight: -11.09 %       Lab/Procedures/Meds    Pertinent " Labs Reviewed: reviewed  Pertinent Labs Comments: Glu 164  Pertinent Medications Reviewed: reviewed  Pertinent Medications Comments:  acetaminophen   atorvastatin   metoprolol succinate   pantoprazole   senna     Estimated/Assessed Needs    Weight Used For Calorie Calculations: 78 kg (171 lb 15.3 oz)  Energy Calorie Requirements (kcal): 1693 kcal/day  Energy Need Method: [place]-St Jeor(PAL 1.25)  Protein Requirements: 94 g/day(1.2 g/kg)  Weight Used For Protein Calculations: 78 kg (171 lb 15.3 oz)  Fluid Requirements (mL): 1 mL/kcal or PER MD   Estimated Fluid Requirement Method: RDA Method  RDA Method (mL): 1693     Nutrition Prescription Ordered    Current Diet Order: Diabetic 2000 kcal; renal; mechanical soft    Evaluation of Received Nutrient/Fluid Intake    I/O: 1320/1405  Energy Calories Required: not meeting needs  Protein Required: not meeting needs  Fluid Required: meeting needs  Comments: LBM: 10/21  Tolerance: tolerating  % Intake of Estimated Energy Needs: 25 - 50 %   % Meal Intake: 25 - 50 %     Nutrition Risk    Level of Risk/Frequency of Follow-up: high(1x/week)     Assessment and Plan    Nutrition Problem  Inadequate oral intake     Related to (etiology):   AMS     Signs and Symptoms (as evidenced by):   PO intake < 50% of meals      Interventions:  Collaboration of care with other providers  [employer] - Boost Glucose to provide calories and protein     Nutrition Diagnosis Status:   New    Monitor and Evaluation    Food and Nutrient Intake: energy intake, food and beverage intake  Food and Nutrient Adminstration: diet order  Anthropometric Measurements: weight, weight change, body mass index  Biochemical Data, Medical Tests and Procedures: electrolyte and renal panel, gastrointestinal profile, glucose/endocrine profile, inflammatory profile, lipid profile  Nutrition-Focused Physical Findings: overall appearance     Malnutrition Assessment    Subcutaneous Fat Loss (Final Summary): well  nourished  Muscle Loss Evaluation (Final Summary): well nourished       Nutrition Follow-Up    RD Follow-up?: Yes

## 2019-10-22 NOTE — PROGRESS NOTES
Patient c/o pain in feet, stating it was level 10; on-call physician Dr. Klaus Guzman contacted.  Initially Dr. Guzman gave order for 25mg Tramadol, but pharmacy doesn't have 25mg dose.  Dr. Guzman was contacted again and changed order to 15mg Toradol, 1x dose.  Order entered into computer and administered by ART Garg.  Patient had bed bath and clean gown and linen.  Patient currently resting with head of bed elevated.  Shift report will be given to oncoming nurse.

## 2019-10-22 NOTE — PROGRESS NOTES
1110 TN contacted [facility] at [phone] to speak with Sana; unavailable, left message to contact TN; informing pt will discharge today; orders will be sent.     TN met with pt and pt's sister, Cass to inform of discharge today, if tolerated diet, back to [facility].    1430 TN contacted [facility] to speak with Zuly, Admissions Nurse, to inform nursing orders uploaded; awaiting returnn call. Left TN's contact information.    0124 TN contacted pt's sister, Cass, at [phone] to inform d/c will be tomorrow.

## 2019-10-22 NOTE — PLAN OF CARE
Problem: Oral Intake Inadequate  Goal: Improved Oral Intake  Outcome: Ongoing, Progressing  Intervention: Promote and Optimize Oral Intake  Flowsheets (Taken 10/22/2019 2975)  Oral Nutrition Promotion: calorie dense liquids provided; calorie dense foods provided; nutritional therapy counseling provided

## 2019-10-23 VITALS
RESPIRATION RATE: 18 BRPM | TEMPERATURE: 99 F | WEIGHT: 201.5 LBS | HEART RATE: 80 BPM | DIASTOLIC BLOOD PRESSURE: 55 MMHG | SYSTOLIC BLOOD PRESSURE: 116 MMHG | HEIGHT: 69 IN | OXYGEN SATURATION: 95 % | BODY MASS INDEX: 29.84 KG/M2

## 2019-10-23 LAB
POCT GLUCOSE: 154 MG/DL (ref 70–110)
POCT GLUCOSE: 197 MG/DL (ref 70–110)

## 2019-10-23 PROCEDURE — 25000003 PHARM REV CODE 250: Performed by: STUDENT IN AN ORGANIZED HEALTH CARE EDUCATION/TRAINING PROGRAM

## 2019-10-23 PROCEDURE — 94761 N-INVAS EAR/PLS OXIMETRY MLT: CPT

## 2019-10-23 PROCEDURE — 94640 AIRWAY INHALATION TREATMENT: CPT

## 2019-10-23 PROCEDURE — 25000242 PHARM REV CODE 250 ALT 637 W/ HCPCS: Performed by: STUDENT IN AN ORGANIZED HEALTH CARE EDUCATION/TRAINING PROGRAM

## 2019-10-23 RX ADMIN — ACETAMINOPHEN 650 MG: 325 TABLET, FILM COATED ORAL at 05:10

## 2019-10-23 RX ADMIN — FUROSEMIDE 40 MG: 40 TABLET ORAL at 10:10

## 2019-10-23 RX ADMIN — IPRATROPIUM BROMIDE AND ALBUTEROL SULFATE 3 ML: .5; 3 SOLUTION RESPIRATORY (INHALATION) at 12:10

## 2019-10-23 RX ADMIN — METOPROLOL SUCCINATE 50 MG: 50 TABLET, EXTENDED RELEASE ORAL at 10:10

## 2019-10-23 RX ADMIN — IPRATROPIUM BROMIDE AND ALBUTEROL SULFATE 3 ML: .5; 3 SOLUTION RESPIRATORY (INHALATION) at 07:10

## 2019-10-23 RX ADMIN — GABAPENTIN 100 MG: 100 CAPSULE ORAL at 10:10

## 2019-10-23 RX ADMIN — APIXABAN 5 MG: 5 TABLET, FILM COATED ORAL at 10:10

## 2019-10-23 RX ADMIN — ATORVASTATIN CALCIUM 10 MG: 10 TABLET, FILM COATED ORAL at 10:10

## 2019-10-23 NOTE — PLAN OF CARE
"   10/23/19 1148   Final Note   Assessment Type Final Discharge Note   Anticipated Discharge Disposition [facility]   What phone number can be called within the next 1-3 days to see how you are doing after discharge?   (Listed in chart)   Hospital Follow Up  Appt(s) scheduled? Yes   Discharge plans and expectations educations in teach back method with documentation complete? Yes   Right Care Referral Info   Post Acute Recommendation Other  (Nursing Home [facility])     TN reviewed follow up appointment information as well as  "Post op discharge instructions" handout with patient using teach back while informing patient to concentrate on signs and symptoms to look for after discharge that would flag her that she needs to contact the doctor. Patient is in agreement and verbalized an understanding. Placed discharge information in blue discharge folder.  SW also reviewed patient responsibility checklist with her using teach back. Patient was able to verbalize her responsibilities after discharge to manager her care at home being   1. Going to follow up appointments   2.  rx from the pharmacy when discharged  3. Taking her medication as prescribed     Patient's nurse, Vincenzo, informed that patient can discharge from  standpoint. Nurse can now complete discharge and review signs and symptoms teaching.   "

## 2019-10-23 NOTE — DISCHARGE SUMMARY
[facility]  General Surgery  Discharge Summary      Patient Name: Ivan Cruz  MRN: [id number]  Admission Date: 10/15/2019  Hospital Length of Stay: 8 days  Discharge Date and Time:  10/23/2019 10:41 AM  Attending Physician: Caleb Ellis MD   Discharging Provider: Klaus Guzman MD  Primary Care Provider: Ezio Vivar MD    HPI:   No notes on file    Procedure(s) (LRB):  COLECTOMY, RIGHT, LAPAROSCOPIC (N/A)      Indwelling Lines/Drains at time of discharge:   Lines/Drains/Airways     Pressure Ulcer                 Pressure Injury 10/15/19 6342 lower Sacral spine Stage 2 7 days              Hospital Course: No notes on file  This is a 73 YOF with a right colon mass that underwent a lap right colectomy without complications. Her postoperative course was complicated with altered mental status that resolved after POD 6. Her imaging and labs were all normal. EEG with no seizures. She is tolerating a diet and having bowel function. She was discharged back to her nursing home with f/u in two weeks.     Consults:   Consults (From admission, onward)        Status Ordering Provider     Inpatient consult to Neurology  Once     Provider:  Nik Crouch MD    Completed KLAUS GUZMAN          Significant Diagnostic Studies: Labs:   CMP   Recent Labs   Lab 10/22/19  0511      K 3.7      CO2 24   *   BUN 15   CREATININE 0.7   CALCIUM 8.5*   PROT 5.8*   ALBUMIN 2.9*   BILITOT 1.1*   ALKPHOS 134   AST 11   ALT 8*   ANIONGAP 7*   ESTGFRAFRICA >60   EGFRNONAA >60    and CBC   Recent Labs   Lab 10/22/19  0511   WBC 4.33   HGB 9.4*   HCT 30.0*   PLT 73*       Pending Diagnostic Studies:     None        Final Active Diagnoses:    Diagnosis Date Noted POA    PRINCIPAL PROBLEM:  Colonic mass [K63.89] 10/15/2019 Yes      Problems Resolved During this Admission:      Discharged Condition: good    Disposition: Another Health Care Institution Not Defined [facility]    Follow  Up:  Follow-up Information     Caleb Ellis MD On 10/29/2019.    Specialties:  General Surgery, Oncology  Why:  For wound re-check, Outpatient Services Surgery Follow-Up Tuesday at 1:30 PM.  Contact information:  [address]  [address]  [place] [address]  [phone]             [facility].    Specialties:  LTAC, Nursing Home Agency  Why:  Nursing Home  Contact information:  [address] [facility] [address]  [phone]             Caleb Ellis MD In 2 weeks.    Specialties:  General Surgery, Oncology  Why:  For wound re-check  Contact information:  [address]  [address]  [facility] [address]  [phone]                 Patient Instructions:      Diet diabetic     Notify your health care provider if you experience any of the following:  temperature >100.4     Notify your health care provider if you experience any of the following:  redness, tenderness, or signs of infection (pain, swelling, redness, odor or green/yellow discharge around incision site)     No dressing needed     Activity as tolerated     Medications:  Reconciled Home Medications:      Medication List      CONTINUE taking these medications    acetaminophen 500 MG tablet  Commonly known as:  TYLENOL  Take 500 mg by mouth every 8 (eight) hours as needed for Pain.     apixaban 5 mg Tab  Commonly known as:  ELIQUIS  Take 5 mg by mouth 2 (two) times daily.     atorvastatin 10 MG tablet  Commonly known as:  LIPITOR  10 mg once daily.     cholecalciferol (vitamin D3) 50,000 unit Tab  Take 1 tablet by mouth once a week.     docusate sodium 100 MG capsule  Commonly known as:  COLACE  Take 100 mg by mouth once daily.     escitalopram oxalate 10 MG tablet  Commonly known as:  LEXAPRO  Take 10 mg by mouth once daily.     furosemide 40 MG tablet  Commonly known as:  LASIX  Take 40 mg by mouth once daily.     gabapentin 100 MG capsule  Commonly known as:  NEURONTIN  Take 200 mg by mouth every evening.     glipiZIDE 10 MG  Tr24  Commonly known as:  GLUCOTROL  Take 10 mg by mouth daily with breakfast.     IMODIUM A-D 2 mg Tab  Generic drug:  loperamide  Take 4 mg by mouth daily as needed.     melatonin 5 mg Tab  Take 5 mg by mouth every evening.     metoprolol succinate 50 MG 24 hr tablet  Commonly known as:  TOPROL-XL  Take 50 mg by mouth once daily.     mirtazapine 7.5 MG Tab  Commonly known as:  REMERON  Take 7.5 mg by mouth once daily.     multivitamin capsule  Take 1 capsule by mouth once daily.     NovoLOG Flexpen U-100 Insulin 100 unit/mL (3 mL) Inpn pen  Generic drug:  insulin aspart U-100  Inject into the skin.     pantoprazole 20 MG tablet  Commonly known as:  PROTONIX  Take 20 mg by mouth once daily.     potassium chloride 10 MEQ Tbsr  Commonly known as:  KLOR-CON  Take 10 mEq by mouth 2 (two) times daily.     simethicone 80 MG chewable tablet  Commonly known as:  MYLICON  Take 80 mg by mouth 2 (two) times daily.     SITagliptin 50 MG Tab  Commonly known as:  JANUVIA  Take 50 mg by mouth once daily.          Time spent on the discharge of patient: 5 minutes    Klaus Guzman MD  General Surgery  [facility]

## 2019-10-23 NOTE — PROGRESS NOTES
1033 TN contacted [facility] at 0925 and 0955 at [phone] for Sana to inform pt is ready to be discharged; will send updated orders; left messages to contact TN. TN conveyed to Dr. Guzman to update orders.    TN received a call from Sana. TN informed of above.    1052 TN uploaded updated orders and discharge summary via Right Care to [facility]; awaiting call report. Contacted Sana to inform of upload.    1100 TN received call report from Sana of [facility]; report to [place]; room [address]A; nurse, Shamar.    TN contacted pt's sister, Cass Lin, at [phone]; spoke with Ms. Lin explained pt is discharged and transportation is arranged for 12:30 PM.    ADT 30 order placed for  Transportation.  ETA:12:30 PM. If transportation does not arrive at ETA time nurse, Vincenzo, will be instructed to follow protocol for transportation below:  How can I get in touch directly with dispatch, if needed?                 Non-emergent dispatch: [phone]                                    Emergent dispatch: [phone]  Non-emergent (stretcher): [phone]  Escalation Needs (PFC Lead): [phone]

## 2019-10-23 NOTE — PLAN OF CARE
10/23/19 1148   Post-Acute Status   Post-Acute Authorization Placement  ([facility])   Post-Acute Placement Status Set-up Complete   Discharge Delays (!) Patient Transportation  ([facility]; scheduled for 12:30 PM.)

## 2019-10-23 NOTE — PLAN OF CARE
10/23/19 1146   Medicare Message   Important Message from Medicare regarding Discharge Appeal Rights Given to patient/caregiver;Explained to patient/caregiver;Signed/date by patient/caregiver   Date IMM was signed 10/23/19   Time IMM was signed [id number]

## 2019-10-23 NOTE — PLAN OF CARE
NURSING HOME ORDERS     10/23/2019     Admit to Nursing Home:  Regular Bed         Diagnoses:        Active Hospital Problems     Diagnosis   POA    *Colonic mass [K63.89]   Yes       Resolved Hospital Problems   No resolved problems to display.         Patient is homebound due to:  Colonic mass     Allergies:        Review of patient's allergies indicates:   Allergen Reactions    Biaxin [clarithromycin]         Doesn't know reaction         Vitals:          Once weekly        Diet: Diabetic Diet       Supplement:  1 can every three times a day with meals                         Type:  Ensure                   Acitivities:     - Up in a chair each morning as tolerated   - Ambulate with assistance to bathroom   - Scheduled walks once each shift (every 8 hours)   - May ambulate independently   - May use walker, cane, or self-propelled wheelchair        LABS:  None needed     Nursing Precautions:       - Fall precautions per nursing home protocol     CONSULTS:                Physical Therapy to evaluate and treat                 Nutrition to evaluate and recommend diet        MISCELLANEOUS CARE:                            Routine Skin for Bedridden Patients:  Apply moisture barrier cream to all                          skin folds and wet areas in perineal area daily and after baths and                           all bowel movements.           WOUND CARE:   Keep incision clean and dry. May shower. Do not submerge incision underwater. No dressing needed.                                                                                                                                                                                                           DIABETES CARE:         Check blood sugar:                              Fingerstick blood sugar AC and HS                               Report CBG < 60 or > 400 to physician.                                           Insulin Sliding Scale                  Glucose                      Novolog Insulin Subcutaneous               0 - 60                          Orange juice or glucose tablet, hold insulin                                       No insulin              201-250                       2 units              251-300                       4 units              301-350                       6 units              351-400                       8 units              >400                            10 units then call physician        Medications: Discontinue all previous medication orders, if any. See new list below.                   Ivan Cruz   Home Medication Instructions ANA MARIA:[id number]     Printed on:10/22/19 5587   Medication Information                                       acetaminophen (TYLENOL) 500 MG tablet  Take 500 mg by mouth every 8 (eight) hours as needed for Pain.                      apixaban (ELIQUIS) 5 mg Tab  Take 5 mg by mouth 2 (two) times daily.                      atorvastatin (LIPITOR) 10 MG tablet  10 mg once daily.                       cholecalciferol, vitamin D3, 50,000 unit Tab  Take 1 tablet by mouth once a week.                      docusate sodium (COLACE) 100 MG capsule  Take 100 mg by mouth once daily.                      escitalopram oxalate (LEXAPRO) 10 MG tablet  Take 10 mg by mouth once daily.                      furosemide (LASIX) 40 MG tablet  Take 40 mg by mouth once daily.                      gabapentin (NEURONTIN) 100 MG capsule  Take 200 mg by mouth every evening.                      glipiZIDE (GLUCOTROL) 10 MG TR24  Take 10 mg by mouth daily with breakfast.                       insulin aspart U-100 (NOVOLOG FLEXPEN U-100 INSULIN) 100 unit/mL (3 mL) InPn pen  Inject into the skin.                      loperamide (IMODIUM A-D) 2 mg Tab  Take 4 mg by mouth daily as needed.                      melatonin 5 mg Tab  Take 5 mg by mouth every evening.                      metoprolol succinate (TOPROL-XL)  50 MG 24 hr tablet  Take 50 mg by mouth once daily.                      mirtazapine (REMERON) 7.5 MG Tab  Take 7.5 mg by mouth once daily.                      multivitamin capsule  Take 1 capsule by mouth once daily.                                            pantoprazole (PROTONIX) 20 MG tablet  Take 20 mg by mouth once daily.                      potassium chloride (KLOR-CON) 10 MEQ TbSR  Take 10 mEq by mouth 2 (two) times daily.                      simethicone (MYLICON) 80 MG chewable tablet  Take 80 mg by mouth 2 (two) times daily.                      SITagliptin (JANUVIA) 50 MG Tab  Take 50 mg by mouth once daily.                                     _________________________________  Klaus Guzman MD  10/22/2019

## 2019-10-23 NOTE — PROGRESS NOTES
Follow-up Information     Caleb Ellis MD On 10/29/2019.    Specialties:  General Surgery, Oncology  Why:  For wound re-check, Outpatient Services Surgery Follow-Up Tuesday at 1:30 PM.  Contact information:  [address]  [address]  [place] [address]  [phone]             [facility].    Specialties:  LTAC, Nursing Home Agency  Why:  Nursing Home  Contact information:  [address] [facility]  [place] [address]  [phone]               ADT 30 order placed for  Transportation.  ETA:  If transportation does not arrive at ETA time nurse will be instructed to follow protocol for transportation below:  How can I get in touch directly with dispatch, if needed?                 Non-emergent dispatch: [phone]                                    Emergent dispatch: [phone]  Non-emergent (stretcher): [phone]  Escalation Needs (PFC Lead): [phone] BRING TO ALL FOLLOW UP APPOINTMENTS:   1) A COPY YOUR DISCHARGE INSTRUCTIONS   2) ALL MEDICINES YOU ARE CURRENTLY TAKING IN THEIR ORIGINAL BOTTLES   3) IDENTIFICATION CARD   4) INSURANCE CARD    **PLEASE ARRIVE 15 MINUTES AHEAD OF SCHEDULED APPOINTMENT TIME   ++PLEASE CALL 24 HOURS IN ADVANCE IF YOU MUST RESCHEDULE YOUR APPOINTMENT DAY AND/OR TIME     [facility] CARE MANAGEMENT WRITTEN DISCHARGE INFORMATION    APPOINTMENTS AND RESOURCES TO HELP YOU MANAGE YOUR CARE AT HOME BASED ON YOUR PREFERENCES:  (If an appointment is not scheduled for you when you leave the hospital, call your doctor to schedule a follow up visit within a week)        Healthy Living Instructions to HELP MANAGE YOUR CARE AT HOME:  Things You are responsible for:  1.    Getting your prescriptions filled   2.    Taking your medications as directed, DO NOT MISS ANY DOSES!  3.    Following the diet and exercise recommended by your doctor  4.    Going to your follow-up doctor appointment. This is important because it allows the doctor to monitor your progress and  determine if any changes need to made to your treatment plan.  5. If you have any questions about MANAGING YOUR CARE AT HOME Call the Nurse Care Line for 24/7 Assistance [phone]       Please answer any calls you may receive from [facility]. We want to continue to support you as you manage your healthcare needs. [facility] is happy to have the opportunity to serve you.      Thank you for choosing [facility] for your healthcare needs!  Your [facility] Case Management Team,    Rosauar Hernandez RN [place]  Registered Nurse Transition Navigator  [phone]

## 2019-10-28 ENCOUNTER — OFFICE VISIT (OUTPATIENT)
Dept: SURGERY | Facility: CLINIC | Age: 74
End: 2019-10-28
Payer: MEDICARE

## 2019-10-28 VITALS
HEART RATE: 78 BPM | DIASTOLIC BLOOD PRESSURE: 77 MMHG | BODY MASS INDEX: 29.77 KG/M2 | OXYGEN SATURATION: 98 % | SYSTOLIC BLOOD PRESSURE: 124 MMHG | HEIGHT: 69 IN | WEIGHT: 201 LBS

## 2019-10-28 DIAGNOSIS — D12.2 ADENOMATOUS POLYP OF ASCENDING COLON: Primary | ICD-10-CM

## 2019-10-28 PROCEDURE — 99024 POSTOP FOLLOW-UP VISIT: CPT | Mod: S$GLB,,, | Performed by: SURGERY

## 2019-10-28 PROCEDURE — 99024 PR POST-OP FOLLOW-UP VISIT: ICD-10-PCS | Mod: S$GLB,,, | Performed by: SURGERY

## 2024-11-24 NOTE — PROGRESS NOTES
Subjective:       Patient ID: Ivan Cruz is a 73 y.o. female.    Chief Complaint: Post-op Evaluation    HPI 72 yo female with recent lap colon resection for polyp  Review of Systems   Constitutional: Negative.    HENT: Negative.    Eyes: Negative.    Respiratory: Negative.    Cardiovascular: Negative.    Gastrointestinal: Negative.    Endocrine: Negative.    Musculoskeletal: Negative.    Skin: Negative.    Allergic/Immunologic: Negative.    Neurological: Negative.    Hematological: Negative.    Psychiatric/Behavioral: Negative.    All other systems reviewed and are negative.      Objective:      Physical Exam   Constitutional: She is oriented to person, place, and time. She appears well-developed and well-nourished.   HENT:   Head: Normocephalic and atraumatic.   Right Ear: External ear normal.   Left Ear: External ear normal.   Nose: Nose normal.   Mouth/Throat: Oropharynx is clear and moist.   Eyes: Pupils are equal, round, and reactive to light. Conjunctivae and EOM are normal.   Neck: Normal range of motion. Neck supple.   Cardiovascular: Normal rate, regular rhythm, normal heart sounds and intact distal pulses.   Pulmonary/Chest: Effort normal and breath sounds normal.   Abdominal: Soft. Bowel sounds are normal.       Musculoskeletal: Normal range of motion.   Neurological: She is alert and oriented to person, place, and time. She has normal reflexes.   Skin: Skin is warm and dry.   Psychiatric: She has a normal mood and affect. Her behavior is normal. Thought content normal.   Vitals reviewed.      Assessment:       1. Adenomatous polyp of ascending colon      doing well  Plan:       I will see her back in 3 weeks with CBC CMP CEA     
No

## (undated) DEVICE — IRRIGATOR ENDOSCOPY DISP.

## (undated) DEVICE — PACK ENDOSCOPY GENERAL

## (undated) DEVICE — KIT SEAL HEMSTAT EVICEL 35CM

## (undated) DEVICE — BLADE SURG CARBON STEEL SZ11

## (undated) DEVICE — SLEEVE SCD EXPRESS CALF MEDIUM

## (undated) DEVICE — SUT VICRYL PLUS 0 CT1 36IN

## (undated) DEVICE — TROCAR ENDOPATH XCEL 5X100MM

## (undated) DEVICE — Device

## (undated) DEVICE — RELOAD ECHELON FLEX BLU 60MM

## (undated) DEVICE — KIT FIBRIN SEALANT EVICEL 5 ML

## (undated) DEVICE — BELLOW CANN HEMOBLAST 1.65GR

## (undated) DEVICE — SUPPORT ULNA NERVE PROTECTOR

## (undated) DEVICE — SUT VICRYL PLUS 3-0 SH 18IN

## (undated) DEVICE — SEAL CAP ASSEMBLY

## (undated) DEVICE — NDL HYPO REG 25G X 1 1/2

## (undated) DEVICE — GLOVE BIOGEL 7.5

## (undated) DEVICE — TUBING INSUFFLATION 10

## (undated) DEVICE — SYR 10CC LUER LOCK

## (undated) DEVICE — GLOVE SURG BIOGEL LATEX SZ 7.5

## (undated) DEVICE — APPLICATOR HEMOBLAST LAPSCP

## (undated) DEVICE — DRESSING ADH ISLAND 2.5 X 3

## (undated) DEVICE — STAPLER INT PROX TX 30X3.5MM

## (undated) DEVICE — JELLY LUBRICANT STERILE 4 OZ

## (undated) DEVICE — BLANKET UPPER BODY 78.7X29.9IN

## (undated) DEVICE — SEE MEDLINE ITEM 157117

## (undated) DEVICE — SHEARS HARMONIC 36CM HD 1000I

## (undated) DEVICE — SEE MEDLINE ITEM 152622

## (undated) DEVICE — SPONGE LAP 18X18 PREWASHED

## (undated) DEVICE — ELECTRODE REM PLYHSV RETURN 9

## (undated) DEVICE — APPLICATOR CHLORAPREP ORN 26ML

## (undated) DEVICE — SYS CLSR DERMABOND PRINEO 22CM

## (undated) DEVICE — COVER OVERHEAD SURG LT BLUE

## (undated) DEVICE — APPLIER CLIP ENDO LIGAMAX 5MM

## (undated) DEVICE — SOL NS 1000CC

## (undated) DEVICE — KIT ANTIFOG

## (undated) DEVICE — SEE MEDLINE ITEM 146417

## (undated) DEVICE — CANISTER SUCTION 2 LTR

## (undated) DEVICE — SUT STRATAFIX 4-0 30CM PS-2

## (undated) DEVICE — RELOAD ECHELON FLEX GRN 60MM

## (undated) DEVICE — SUT VICRYL PLUS 4-0 P3 18IN

## (undated) DEVICE — POSITIONER HEAD DONUT 9IN FOAM

## (undated) DEVICE — SOL 9P NACL IRR PIC IL

## (undated) DEVICE — TRAY FOLEY 16FR INFECTION CONT

## (undated) DEVICE — STAPLER ECHELON FLEX GST 60MM

## (undated) DEVICE — CLOSURE SKIN STERI STRIP 1/2X4